# Patient Record
Sex: FEMALE | Race: WHITE | NOT HISPANIC OR LATINO | Employment: FULL TIME | URBAN - METROPOLITAN AREA
[De-identification: names, ages, dates, MRNs, and addresses within clinical notes are randomized per-mention and may not be internally consistent; named-entity substitution may affect disease eponyms.]

---

## 2017-01-03 ENCOUNTER — OFFICE VISIT - HEALTHEAST (OUTPATIENT)
Dept: FAMILY MEDICINE | Facility: CLINIC | Age: 42
End: 2017-01-03

## 2017-01-03 DIAGNOSIS — R30.0 DYSURIA: ICD-10-CM

## 2017-01-03 DIAGNOSIS — N39.0 UTI (URINARY TRACT INFECTION): ICD-10-CM

## 2017-02-02 ENCOUNTER — COMMUNICATION - HEALTHEAST (OUTPATIENT)
Dept: ENDOCRINOLOGY | Facility: CLINIC | Age: 42
End: 2017-02-02

## 2017-02-02 DIAGNOSIS — E03.9 UNSPECIFIED HYPOTHYROIDISM: ICD-10-CM

## 2017-06-06 ENCOUNTER — COMMUNICATION - HEALTHEAST (OUTPATIENT)
Dept: INTERNAL MEDICINE | Facility: CLINIC | Age: 42
End: 2017-06-06

## 2017-06-06 DIAGNOSIS — N39.0 UTI (URINARY TRACT INFECTION): ICD-10-CM

## 2017-10-17 ENCOUNTER — OFFICE VISIT - HEALTHEAST (OUTPATIENT)
Dept: INTERNAL MEDICINE | Facility: CLINIC | Age: 42
End: 2017-10-17

## 2017-10-17 ENCOUNTER — COMMUNICATION - HEALTHEAST (OUTPATIENT)
Dept: ENDOCRINOLOGY | Facility: CLINIC | Age: 42
End: 2017-10-17

## 2017-10-17 DIAGNOSIS — Z13.228 SCREENING FOR METABOLIC DISORDER: ICD-10-CM

## 2017-10-17 DIAGNOSIS — D35.3 BENIGN NEOPLASM OF PITUITARY GLAND AND CRANIOPHARYNGEAL DUCT (POUCH) (H): ICD-10-CM

## 2017-10-17 DIAGNOSIS — E03.9 HYPOTHYROIDISM: ICD-10-CM

## 2017-10-17 DIAGNOSIS — K51.90 ULCERATIVE COLITIS (H): ICD-10-CM

## 2017-10-17 DIAGNOSIS — D35.2 BENIGN NEOPLASM OF PITUITARY GLAND AND CRANIOPHARYNGEAL DUCT (POUCH) (H): ICD-10-CM

## 2017-10-17 DIAGNOSIS — K83.09 CHOLANGITIS (H): ICD-10-CM

## 2017-10-17 DIAGNOSIS — Z13.220 SCREENING CHOLESTEROL LEVEL: ICD-10-CM

## 2017-10-17 LAB
CHOLEST SERPL-MCNC: 255 MG/DL
FASTING STATUS PATIENT QL REPORTED: NO
HDLC SERPL-MCNC: 69 MG/DL
LDLC SERPL CALC-MCNC: 152 MG/DL
TRIGL SERPL-MCNC: 168 MG/DL

## 2017-10-17 ASSESSMENT — MIFFLIN-ST. JEOR: SCORE: 1842.48

## 2017-10-30 ENCOUNTER — AMBULATORY - HEALTHEAST (OUTPATIENT)
Dept: NURSING | Facility: CLINIC | Age: 42
End: 2017-10-30

## 2017-10-30 DIAGNOSIS — Z00.00 HEALTH CARE MAINTENANCE: ICD-10-CM

## 2017-12-06 ENCOUNTER — RECORDS - HEALTHEAST (OUTPATIENT)
Dept: ADMINISTRATIVE | Facility: OTHER | Age: 42
End: 2017-12-06

## 2017-12-22 ENCOUNTER — COMMUNICATION - HEALTHEAST (OUTPATIENT)
Dept: ENDOCRINOLOGY | Facility: CLINIC | Age: 42
End: 2017-12-22

## 2017-12-22 DIAGNOSIS — E03.9 HYPOTHYROIDISM: ICD-10-CM

## 2018-02-10 ENCOUNTER — COMMUNICATION - HEALTHEAST (OUTPATIENT)
Dept: ENDOCRINOLOGY | Facility: CLINIC | Age: 43
End: 2018-02-10

## 2018-02-10 DIAGNOSIS — E03.9 HYPOTHYROIDISM: ICD-10-CM

## 2018-03-27 ENCOUNTER — COMMUNICATION - HEALTHEAST (OUTPATIENT)
Dept: ENDOCRINOLOGY | Facility: CLINIC | Age: 43
End: 2018-03-27

## 2018-03-27 DIAGNOSIS — E03.9 HYPOTHYROIDISM: ICD-10-CM

## 2018-05-25 ENCOUNTER — COMMUNICATION - HEALTHEAST (OUTPATIENT)
Dept: ENDOCRINOLOGY | Facility: CLINIC | Age: 43
End: 2018-05-25

## 2018-05-25 DIAGNOSIS — E03.9 HYPOTHYROIDISM: ICD-10-CM

## 2018-06-11 ENCOUNTER — OFFICE VISIT - HEALTHEAST (OUTPATIENT)
Dept: ENDOCRINOLOGY | Facility: CLINIC | Age: 43
End: 2018-06-11

## 2018-06-11 DIAGNOSIS — D35.2 BENIGN NEOPLASM OF PITUITARY GLAND AND CRANIOPHARYNGEAL DUCT (POUCH) (H): ICD-10-CM

## 2018-06-11 DIAGNOSIS — D35.3 BENIGN NEOPLASM OF PITUITARY GLAND AND CRANIOPHARYNGEAL DUCT (POUCH) (H): ICD-10-CM

## 2018-06-11 DIAGNOSIS — E03.9 HYPOTHYROIDISM, UNSPECIFIED TYPE: ICD-10-CM

## 2018-06-11 ASSESSMENT — MIFFLIN-ST. JEOR: SCORE: 1925.99

## 2018-06-12 LAB
CALCIUM SERPL-MCNC: 9.4 MG/DL (ref 8.5–10.5)
CREAT SERPL-MCNC: 0.75 MG/DL (ref 0.6–1.1)
GFR SERPL CREATININE-BSD FRML MDRD: >60 ML/MIN/1.73M2
HBA1C MFR BLD: 5.7 % (ref 3.5–6)
POTASSIUM BLD-SCNC: 4.2 MMOL/L (ref 3.5–5)
PROLACTIN SERPL-MCNC: 14.8 NG/ML (ref 0–20)
PTH-INTACT SERPL-MCNC: 72 PG/ML (ref 10–86)
T3 SERPL-MCNC: 109 NG/DL (ref 45–175)
T4 FREE SERPL-MCNC: 1.5 NG/DL (ref 0.7–1.8)
TSH SERPL DL<=0.005 MIU/L-ACNC: 0.24 UIU/ML (ref 0.3–5)

## 2018-06-13 ENCOUNTER — HOSPITAL ENCOUNTER (OUTPATIENT)
Dept: ULTRASOUND IMAGING | Facility: HOSPITAL | Age: 43
Discharge: HOME | End: 2018-06-13
Attending: INTERNAL MEDICINE

## 2018-06-13 DIAGNOSIS — D35.3 BENIGN NEOPLASM OF PITUITARY GLAND AND CRANIOPHARYNGEAL DUCT (POUCH) (H): ICD-10-CM

## 2018-06-13 DIAGNOSIS — D35.2 BENIGN NEOPLASM OF PITUITARY GLAND AND CRANIOPHARYNGEAL DUCT (POUCH) (H): ICD-10-CM

## 2018-06-13 LAB
25(OH)D3 SERPL-MCNC: 19.5 NG/ML (ref 30–80)
25(OH)D3 SERPL-MCNC: 19.5 NG/ML (ref 30–80)

## 2018-06-14 LAB — THYROID PEROXIDASE ANTIBODIES - HISTORICAL: 89.3 IU/ML (ref 0–5.6)

## 2018-06-19 ENCOUNTER — COMMUNICATION - HEALTHEAST (OUTPATIENT)
Dept: ENDOCRINOLOGY | Facility: CLINIC | Age: 43
End: 2018-06-19

## 2018-06-29 ENCOUNTER — COMMUNICATION - HEALTHEAST (OUTPATIENT)
Dept: ENDOCRINOLOGY | Facility: CLINIC | Age: 43
End: 2018-06-29

## 2018-06-29 DIAGNOSIS — E03.9 HYPOTHYROIDISM: ICD-10-CM

## 2018-08-14 ENCOUNTER — OFFICE VISIT - HEALTHEAST (OUTPATIENT)
Dept: FAMILY MEDICINE | Facility: CLINIC | Age: 43
End: 2018-08-14

## 2018-08-14 DIAGNOSIS — K51.90 ULCERATIVE COLITIS (H): ICD-10-CM

## 2018-08-14 DIAGNOSIS — Z00.00 ROUTINE GENERAL MEDICAL EXAMINATION AT A HEALTH CARE FACILITY: ICD-10-CM

## 2018-08-14 DIAGNOSIS — K83.09 CHOLANGITIS (H): ICD-10-CM

## 2018-08-14 DIAGNOSIS — T14.8XXA WOUND INFECTION: ICD-10-CM

## 2018-08-14 DIAGNOSIS — E66.01 MORBID OBESITY (H): ICD-10-CM

## 2018-08-14 DIAGNOSIS — Z12.31 VISIT FOR SCREENING MAMMOGRAM: ICD-10-CM

## 2018-08-14 DIAGNOSIS — L08.9 WOUND INFECTION: ICD-10-CM

## 2018-08-14 ASSESSMENT — MIFFLIN-ST. JEOR: SCORE: 1939.5

## 2018-08-24 ENCOUNTER — HOSPITAL ENCOUNTER (OUTPATIENT)
Dept: MAMMOGRAPHY | Facility: CLINIC | Age: 43
Discharge: HOME | End: 2018-08-24
Attending: FAMILY MEDICINE

## 2018-08-24 DIAGNOSIS — Z12.31 VISIT FOR SCREENING MAMMOGRAM: ICD-10-CM

## 2018-08-30 ENCOUNTER — RECORDS - HEALTHEAST (OUTPATIENT)
Dept: RADIOLOGY | Facility: CLINIC | Age: 43
End: 2018-08-30

## 2018-08-30 ENCOUNTER — RECORDS - HEALTHEAST (OUTPATIENT)
Dept: ADMINISTRATIVE | Facility: OTHER | Age: 43
End: 2018-08-30

## 2018-09-02 ENCOUNTER — COMMUNICATION - HEALTHEAST (OUTPATIENT)
Dept: INTERNAL MEDICINE | Facility: CLINIC | Age: 43
End: 2018-09-02

## 2018-09-02 DIAGNOSIS — K51.90 UC (ULCERATIVE COLITIS) (H): ICD-10-CM

## 2018-09-13 ENCOUNTER — COMMUNICATION - HEALTHEAST (OUTPATIENT)
Dept: INTERNAL MEDICINE | Facility: CLINIC | Age: 43
End: 2018-09-13

## 2018-12-05 ENCOUNTER — COMMUNICATION - HEALTHEAST (OUTPATIENT)
Dept: ENDOCRINOLOGY | Facility: CLINIC | Age: 43
End: 2018-12-05

## 2018-12-05 DIAGNOSIS — D35.3 BENIGN NEOPLASM OF PITUITARY GLAND AND CRANIOPHARYNGEAL DUCT (POUCH) (H): ICD-10-CM

## 2018-12-05 DIAGNOSIS — E03.9 HYPOTHYROIDISM, UNSPECIFIED TYPE: ICD-10-CM

## 2018-12-05 DIAGNOSIS — D35.2 BENIGN NEOPLASM OF PITUITARY GLAND AND CRANIOPHARYNGEAL DUCT (POUCH) (H): ICD-10-CM

## 2018-12-15 ENCOUNTER — COMMUNICATION - HEALTHEAST (OUTPATIENT)
Dept: INTERNAL MEDICINE | Facility: CLINIC | Age: 43
End: 2018-12-15

## 2018-12-15 DIAGNOSIS — K51.90 UC (ULCERATIVE COLITIS) (H): ICD-10-CM

## 2018-12-20 ENCOUNTER — AMBULATORY - HEALTHEAST (OUTPATIENT)
Dept: LAB | Facility: CLINIC | Age: 43
End: 2018-12-20

## 2018-12-20 DIAGNOSIS — E03.9 HYPOTHYROIDISM, UNSPECIFIED TYPE: ICD-10-CM

## 2018-12-20 DIAGNOSIS — D35.2 BENIGN NEOPLASM OF PITUITARY GLAND AND CRANIOPHARYNGEAL DUCT (POUCH) (H): ICD-10-CM

## 2018-12-20 DIAGNOSIS — D35.3 BENIGN NEOPLASM OF PITUITARY GLAND AND CRANIOPHARYNGEAL DUCT (POUCH) (H): ICD-10-CM

## 2018-12-20 LAB
CALCIUM SERPL-MCNC: 9.5 MG/DL (ref 8.5–10.5)
CHOLEST SERPL-MCNC: 207 MG/DL
CREAT SERPL-MCNC: 0.68 MG/DL (ref 0.6–1.1)
FASTING STATUS PATIENT QL REPORTED: YES
GFR SERPL CREATININE-BSD FRML MDRD: >60 ML/MIN/1.73M2
HDLC SERPL-MCNC: 65 MG/DL
LDLC SERPL CALC-MCNC: 107 MG/DL
POTASSIUM BLD-SCNC: 3.9 MMOL/L (ref 3.5–5)
PROLACTIN SERPL-MCNC: 12.4 NG/ML (ref 0–20)
T4 FREE SERPL-MCNC: 1.3 NG/DL (ref 0.7–1.8)
TRIGL SERPL-MCNC: 173 MG/DL
TSH SERPL DL<=0.005 MIU/L-ACNC: 1.5 UIU/ML (ref 0.3–5)

## 2018-12-21 LAB
25(OH)D3 SERPL-MCNC: 14.9 NG/ML (ref 30–80)
25(OH)D3 SERPL-MCNC: 14.9 NG/ML (ref 30–80)

## 2019-01-04 ENCOUNTER — OFFICE VISIT - HEALTHEAST (OUTPATIENT)
Dept: INTERNAL MEDICINE | Facility: CLINIC | Age: 44
End: 2019-01-04

## 2019-01-04 DIAGNOSIS — E03.8 OTHER SPECIFIED HYPOTHYROIDISM: ICD-10-CM

## 2019-01-04 DIAGNOSIS — K51.90 ULCERATIVE COLITIS WITHOUT COMPLICATIONS, UNSPECIFIED LOCATION (H): ICD-10-CM

## 2019-01-04 DIAGNOSIS — K83.01 PSC (PRIMARY SCLEROSING CHOLANGITIS) (H): ICD-10-CM

## 2019-01-04 DIAGNOSIS — E66.01 MORBID OBESITY (H): ICD-10-CM

## 2019-01-04 DIAGNOSIS — E55.9 VITAMIN D DEFICIENCY: ICD-10-CM

## 2019-01-04 DIAGNOSIS — D35.2 PROLACTINOMA (H): ICD-10-CM

## 2019-01-04 ASSESSMENT — MIFFLIN-ST. JEOR: SCORE: 2007.09

## 2019-01-30 ENCOUNTER — AMBULATORY - HEALTHEAST (OUTPATIENT)
Dept: ENDOCRINOLOGY | Facility: CLINIC | Age: 44
End: 2019-01-30

## 2019-01-30 ENCOUNTER — COMMUNICATION - HEALTHEAST (OUTPATIENT)
Dept: ENDOCRINOLOGY | Facility: CLINIC | Age: 44
End: 2019-01-30

## 2019-01-30 DIAGNOSIS — E55.9 VITAMIN D DEFICIENCY: ICD-10-CM

## 2019-01-30 DIAGNOSIS — D35.3 BENIGN NEOPLASM OF PITUITARY GLAND AND CRANIOPHARYNGEAL DUCT (POUCH) (H): ICD-10-CM

## 2019-01-30 DIAGNOSIS — E03.8 OTHER SPECIFIED HYPOTHYROIDISM: ICD-10-CM

## 2019-01-30 DIAGNOSIS — D35.2 BENIGN NEOPLASM OF PITUITARY GLAND AND CRANIOPHARYNGEAL DUCT (POUCH) (H): ICD-10-CM

## 2019-02-04 ENCOUNTER — COMMUNICATION - HEALTHEAST (OUTPATIENT)
Dept: ENDOCRINOLOGY | Facility: CLINIC | Age: 44
End: 2019-02-04

## 2019-02-04 DIAGNOSIS — E03.9 HYPOTHYROIDISM: ICD-10-CM

## 2019-02-18 ENCOUNTER — AMBULATORY - HEALTHEAST (OUTPATIENT)
Dept: LAB | Facility: CLINIC | Age: 44
End: 2019-02-18

## 2019-02-18 DIAGNOSIS — D35.3 BENIGN NEOPLASM OF PITUITARY GLAND AND CRANIOPHARYNGEAL DUCT (POUCH) (H): ICD-10-CM

## 2019-02-18 DIAGNOSIS — E55.9 VITAMIN D DEFICIENCY: ICD-10-CM

## 2019-02-18 DIAGNOSIS — D35.2 BENIGN NEOPLASM OF PITUITARY GLAND AND CRANIOPHARYNGEAL DUCT (POUCH) (H): ICD-10-CM

## 2019-02-18 LAB
CALCIUM SERPL-MCNC: 8.9 MG/DL (ref 8.5–10.5)
CHOLEST SERPL-MCNC: 216 MG/DL
CREAT SERPL-MCNC: 0.7 MG/DL (ref 0.6–1.1)
FASTING STATUS PATIENT QL REPORTED: YES
GFR SERPL CREATININE-BSD FRML MDRD: >60 ML/MIN/1.73M2
HDLC SERPL-MCNC: 54 MG/DL
LDLC SERPL CALC-MCNC: 109 MG/DL
POTASSIUM BLD-SCNC: 4.1 MMOL/L (ref 3.5–5)
PROLACTIN SERPL-MCNC: 10.5 NG/ML (ref 0–20)
T4 FREE SERPL-MCNC: 0.6 NG/DL (ref 0.7–1.8)
TRIGL SERPL-MCNC: 263 MG/DL
TSH SERPL DL<=0.005 MIU/L-ACNC: 10.77 UIU/ML (ref 0.3–5)

## 2019-02-19 LAB
25(OH)D3 SERPL-MCNC: 23.6 NG/ML (ref 30–80)
25(OH)D3 SERPL-MCNC: 23.6 NG/ML (ref 30–80)

## 2019-02-25 ENCOUNTER — COMMUNICATION - HEALTHEAST (OUTPATIENT)
Dept: ENDOCRINOLOGY | Facility: CLINIC | Age: 44
End: 2019-02-25

## 2019-02-28 ENCOUNTER — COMMUNICATION - HEALTHEAST (OUTPATIENT)
Dept: ADMINISTRATIVE | Facility: CLINIC | Age: 44
End: 2019-02-28

## 2019-02-28 DIAGNOSIS — E03.9 HYPOTHYROIDISM, UNSPECIFIED TYPE: ICD-10-CM

## 2019-02-28 DIAGNOSIS — E56.9 VITAMIN DEFICIENCY: ICD-10-CM

## 2019-03-13 ENCOUNTER — COMMUNICATION - HEALTHEAST (OUTPATIENT)
Dept: ENDOCRINOLOGY | Facility: CLINIC | Age: 44
End: 2019-03-13

## 2019-03-13 DIAGNOSIS — E03.9 HYPOTHYROIDISM, UNSPECIFIED TYPE: ICD-10-CM

## 2019-03-13 DIAGNOSIS — E56.9 VITAMIN DEFICIENCY: ICD-10-CM

## 2019-03-31 ENCOUNTER — COMMUNICATION - HEALTHEAST (OUTPATIENT)
Dept: INTERNAL MEDICINE | Facility: CLINIC | Age: 44
End: 2019-03-31

## 2019-03-31 DIAGNOSIS — K51.90 UC (ULCERATIVE COLITIS) (H): ICD-10-CM

## 2019-04-09 ENCOUNTER — COMMUNICATION - HEALTHEAST (OUTPATIENT)
Dept: LAB | Facility: CLINIC | Age: 44
End: 2019-04-09

## 2019-04-09 ENCOUNTER — AMBULATORY - HEALTHEAST (OUTPATIENT)
Dept: LAB | Facility: CLINIC | Age: 44
End: 2019-04-09

## 2019-04-09 DIAGNOSIS — K51.90 ULCERATIVE COLITIS WITHOUT COMPLICATIONS, UNSPECIFIED LOCATION (H): ICD-10-CM

## 2019-04-09 DIAGNOSIS — E03.9 HYPOTHYROIDISM, UNSPECIFIED TYPE: ICD-10-CM

## 2019-04-09 LAB
ALBUMIN SERPL-MCNC: 3.9 G/DL (ref 3.5–5)
ALP SERPL-CCNC: 137 U/L (ref 45–120)
ALT SERPL W P-5'-P-CCNC: 42 U/L (ref 0–45)
ANION GAP SERPL CALCULATED.3IONS-SCNC: 12 MMOL/L (ref 5–18)
AST SERPL W P-5'-P-CCNC: 36 U/L (ref 0–40)
BILIRUB SERPL-MCNC: 0.6 MG/DL (ref 0–1)
BUN SERPL-MCNC: 15 MG/DL (ref 8–22)
CALCIUM SERPL-MCNC: 9.6 MG/DL (ref 8.5–10.5)
CHLORIDE BLD-SCNC: 104 MMOL/L (ref 98–107)
CO2 SERPL-SCNC: 25 MMOL/L (ref 22–31)
CREAT SERPL-MCNC: 0.7 MG/DL (ref 0.6–1.1)
GFR SERPL CREATININE-BSD FRML MDRD: >60 ML/MIN/1.73M2
GLUCOSE BLD-MCNC: 79 MG/DL (ref 70–125)
POTASSIUM BLD-SCNC: 4.2 MMOL/L (ref 3.5–5)
PROT SERPL-MCNC: 7.6 G/DL (ref 6–8)
SODIUM SERPL-SCNC: 141 MMOL/L (ref 136–145)
T4 FREE SERPL-MCNC: 1.5 NG/DL (ref 0.7–1.8)
TSH SERPL DL<=0.005 MIU/L-ACNC: 0.52 UIU/ML (ref 0.3–5)

## 2019-04-10 ENCOUNTER — COMMUNICATION - HEALTHEAST (OUTPATIENT)
Dept: FAMILY MEDICINE | Facility: CLINIC | Age: 44
End: 2019-04-10

## 2019-06-07 ENCOUNTER — TELEPHONE (OUTPATIENT)
Dept: ENDOCRINOLOGY | Facility: CLINIC | Age: 44
End: 2019-06-07

## 2019-06-07 NOTE — TELEPHONE ENCOUNTER
Reason for Call:  Other call back    Detailed comments: Pt would like to know if she needs to do any labs prior to her appt. She states she was seen by another endocrinologist outside of [place], but the endocrinologist is out on leave.    I asked where she saw this provider and she said [facility] and [facility] because they are the same. I tried explaining that they were not. If it would be [facility], she would like to know if the clinic could obtain her records for her.    Phone Number Patient can be reached at: Home number on file [phone] (home)    Best Time: After 6/23/19, any time. [place] will be out of country.    Can we leave a detailed message on this number? YES    Call taken on 6/7/2019 at 4:18 PM by Artur Lima

## 2019-06-10 NOTE — TELEPHONE ENCOUNTER
We can't order labs until patient is seen.    Suyapa Juan, DAVID  [facility]  [place]

## 2019-08-06 ENCOUNTER — OFFICE VISIT (OUTPATIENT)
Dept: ENDOCRINOLOGY | Facility: CLINIC | Age: 44
End: 2019-08-06
Payer: COMMERCIAL

## 2019-08-06 VITALS
WEIGHT: 289 LBS | DIASTOLIC BLOOD PRESSURE: 94 MMHG | RESPIRATION RATE: 16 BRPM | SYSTOLIC BLOOD PRESSURE: 153 MMHG | BODY MASS INDEX: 43.8 KG/M2 | OXYGEN SATURATION: 96 % | HEART RATE: 72 BPM | TEMPERATURE: 97.6 F | HEIGHT: 68 IN

## 2019-08-06 DIAGNOSIS — E22.1 HYPERPROLACTINEMIA (H): Primary | ICD-10-CM

## 2019-08-06 PROBLEM — E66.01 MORBID OBESITY (H): Status: ACTIVE | Noted: 2019-08-06

## 2019-08-06 LAB — PROLACTIN SERPL-MCNC: 10 UG/L (ref 3–27)

## 2019-08-06 PROCEDURE — 36415 COLL VENOUS BLD VENIPUNCTURE: CPT | Performed by: INTERNAL MEDICINE

## 2019-08-06 PROCEDURE — 84146 ASSAY OF PROLACTIN: CPT | Performed by: INTERNAL MEDICINE

## 2019-08-06 PROCEDURE — 99204 OFFICE O/P NEW MOD 45 MIN: CPT | Performed by: INTERNAL MEDICINE

## 2019-08-06 RX ORDER — MESALAMINE 800 MG/1
TABLET, DELAYED RELEASE ORAL
Refills: 0 | COMMUNITY
Start: 2019-04-09 | End: 2022-05-19

## 2019-08-06 RX ORDER — LEVOTHYROXINE SODIUM 112 UG/1
TABLET ORAL
Refills: 1 | COMMUNITY
Start: 2019-06-23 | End: 2021-09-03

## 2019-08-06 RX ORDER — ERGOCALCIFEROL 1.25 MG/1
CAPSULE, LIQUID FILLED ORAL
Refills: 1 | COMMUNITY
Start: 2019-07-21 | End: 2021-10-25

## 2019-08-06 ASSESSMENT — MIFFLIN-ST. JEOR: SCORE: 1995.43

## 2019-08-06 NOTE — PROGRESS NOTES
Name: Sherry Yates  Seen in consultation with  for elevated prolactin.  HPI:  Sherry Yates is a 44 year old female who presents for the evaluation of hyperprolactinemia.  Was diagnosed with hyperprolactinemia 2/2 to prolactin adenoma at age 14. At that time she had nipple discharge.  Was seen by  and  after that.  Was on bromocriptine in past ( from age 14 till age 32). Went off of medication as she was pregnant at that time.  Prolactin was in range after that.  Not on medication since age 32.    Last MRI was many years back,    Breast discharge: no  Mensus: Regular. Not planning pregnancy.  Changes in vision: no  Headaches: no  Medications: no  Hypothyroidism: Currently taking levothyroxine 224 mcg/day. Managed by PCP [facility].    PMH/PSH:  Pituitary Prolactinoma     Obesity     Primary Sclerosing Ascending Cholangitis     Primary Hypothyroidism     Butterfly rash   ERCP induced pancreatitis.      Past Medical History:   Diagnosis Date     PSC (primary sclerosing cholangitis)      Thyroid disease      Ulcerative colitis (H)      Past Surgical History:   Procedure Laterality Date     ABDOMEN SURGERY       Family Hx:  History reviewed. No pertinent family history.    Social Hx:  Social History     Socioeconomic History     Marital status:      Spouse name: Not on file     Number of children: Not on file     Years of education: Not on file     Highest education level: Not on file   Occupational History     Not on file   Social Needs     Financial resource strain: Not on file     Food insecurity:     Worry: Not on file     Inability: Not on file     Transportation needs:     Medical: Not on file     Non-medical: Not on file   Tobacco Use     Smoking status: Never Smoker     Smokeless tobacco: Never Used   Substance and Sexual Activity     Alcohol use: Yes     Comment: rare     Drug use: Never     Sexual activity: Not Currently     Partners: Male   Lifestyle     Physical  "activity:     Days per week: Not on file     Minutes per session: Not on file     Stress: Not on file   Relationships     Social connections:     Talks on phone: Not on file     Gets together: Not on file     Attends Mosque service: Not on file     Active member of club or organization: Not on file     Attends meetings of clubs or organizations: Not on file     Relationship status: Not on file     Intimate partner violence:     Fear of current or ex partner: Not on file     Emotionally abused: Not on file     Physically abused: Not on file     Forced sexual activity: Not on file   Other Topics Concern     Not on file   Social History Narrative     Not on file          MEDICATIONS:  has a current medication list which includes the following prescription(s): levothyroxine, mesalamine, and vitamin d2.    ROS     ROS: 10 point ROS neg other than the symptoms noted above in the HPI.    Physical Exam   VS: BP (!) 153/94 (BP Location: Right arm, Patient Position: Chair, Cuff Size: Adult Large)   Pulse 72   Temp 97.6  F (36.4  C) (Oral)   Resp 16   Ht 1.721 m (5' 7.75\")   Wt 131.1 kg (289 lb)   LMP 08/01/2019   SpO2 96%   Breastfeeding? No   BMI 44.27 kg/m    GENERAL: AXOX3, NAD, well dressed, answering questions appropriately, appears stated age.  HEENT: OP clear, no LAD, no TM, non-tender, no exopthalmous, no proptosis, EOMI, no lig lag, no retraction  CV: RRR, no rubs, gallops, no murmurs  LUNGS: CTAB, no wheezes, rales, or ronchi  ABDOMEN: +BS  EXTREMITIES: no edema, +pulses, no rashes, no lesions  NEUROLOGY: CN grossly intact, + DTR upper and lower extremity, no tremors  MSK: grossly intact  SKIN: no rashes, no lesions    LABS:    All pertinent notes, labs, and images personally reviewed by me.     A/P  Ms.Renae Yates is a 44 year old here for the evaluation of elevated prolactin:  1.  History of prolactinoma:  History of hyperprolactinemia secondary to prolactinoma diagnosed at age 14.  As noted above she " was on bromocriptine from [age].  Off of medication since age 32  No nipple discharge, irregular menstrual cycles, headaches or vision problem  She is not planning pregnancy  Last MRI was 2008?  Records requested  Plan to check prolactin levels.  If normal plan to continue to monitor  If prolactin levels are elevated can pursue further work-up including screening for rest of the pituitary hormones as well as getting MRI brain.    2.  Hypothyroidism:  Managed by primary care provider.  She is on thyroid hormone replacement.  Not discussed today        Prolactin secreting pituitary tumors are the most common type of hormone secreting pituitary tumors.     Causes: Hypothyroidism can be associated with hyperprolactinemia. Hypothyroidism results in increased TRH production, then TRH (which can act as a PRF) could lead to hyperprolactinemia. Estrogens act directly at the pituitary level, causing stimulation of lactotrophs, and thus enhance prolactin secretion. Injury to the chest wall can also lead to hyperprolactinemia; this results from abnormal stimulation of the reflex associated with the rise in prolactin seen in suckling. Certain drugs act as dopamine-receptor-blocking agents, including phenothiazines (e.g. chlorpromazine), butyrophenones (haloperidol), and benzamides (metoclopramide, sulpiride, and domperidone). These drugs block the effects of endogenous dopamine and thus release lactotrophs from their hypothalamic inhibition. Pituitary or stalk tumors with abnormal blood supplies, or their pressure effects, may interfere with the circulatory pathway from the hypothalamus down the pituitary stalk to the normal lactotrophs or a tumor, producing effective dopamine deficiency due to a functional stalk section. Diseases of the hypothalamus, such as tumors, arterio-venous malformations, and inflammatory processes such as sarcoidosis result in either diminished synthesis or release of dopamine. Certain drugs (e.g.  alpha-methyldopa and reserpine) are capable of depleting the central dopamine stores.     The symptoms associated with hyperprolactinemia may be due to several factors: the direct effects of excess prolactin à galactorrhea or hypogonadism; the effects of the structural lesion causing the disorder (i.e. the pituitary tumor) à  headaches, visual field defects, or external ophthalmoplegia; or associated dysfunction of secretion of other anterior pituitary hormones.     Women with hyperprolactinemia usually present with menstrual abnormalities - amenorrhea or oligomenorrhea - or regular cycles with infertility or menorrhagia. Men often present late in the course of the disease with symptoms of expansion of their pituitary tumor (i.e. headaches, visual defects, and external ophthalmoplegia) or symptoms from secondary adrenal or thyroid failure.     A microadenoma has a maximum diameter of up to 10mm, and a macroadenoma as having a diameter > 10mm. A microadenoma has serum prolactin levels below 200ng/mL.  A macroadenoma that secretes prolactin is usually associated with a serum prolactin level of more than 200ng/mL.    When serum prolactin is evaluated by two-site immunometric assays, large amounts of prolactin saturate both the capture and the signal antibodies, impairing their binding, causing serum prolactin to be underestimated (hook effect).   In order to avoid unnecessary surgery (treatment of choice for nonfunctioning tumors), prolactin assays with serum dilution are recommended in patients with macroadenomas who may harbor a prolactinoma.    The anatomy of the pituitary is optimally assessed by MRI. MRI allows imaging of the optic chiasm, the cavernous sinuses, the pituitary (both the normal gland and tumors), and its stalk. MRI allows accurate measurement of the size of the pituitary and of any tumor and its relationship to the optic chiasm and cavernous sinuses.     Cabergoline has an extremely long biological  half life and thus only needs to be administered either once or twice per week at a dose of 0.5 mg/dose.  Cabergoline is generally better tolerated than bromocriptine, so increasing the patient's adherence. Therefore, cabergoline is currently considered the first choice drug for the treatment of prolactinomas, except for patients wishing pregnancy in the short-term. Bromocriptine reduces pituitary tumor size in 75-80% of patients with large prolactin-secreting tumors, even with gross extrasellar extension.    More than 50% of the time spent with Ms. Yates on counseling / coordinating her care.  Available records, labs and images from outside clinic were personally reviewed.    Follow-up:  Based on above.    Fanny Clark MD  Endocrinology   [facility]  CC: No Ref-Primary, Physician    Addendum to above note and clinic visit:    Labs reviewed.    See result note/telephone encounter.

## 2019-08-06 NOTE — LETTER
8/6/2019         RE: Sherry Yates  [address] [facility] [address]        Dear Colleague,    Thank you for referring your patient, Sherry Yates, to the [facility]. Please see a copy of my visit note below.    Name: Sherry Yates  Seen in consultation with  for elevated prolactin.  HPI:  Sherry Yates is a 44 year old female who presents for the evaluation of hyperprolactinemia.  Was diagnosed with hyperprolactinemia 2/2 to prolactin adenoma at age 14. At that time she had nipple discharge.  Was seen by  and  after that.  Was on bromocriptine in past ( from age 14 till age 32). Went off of medication as she was pregnant at that time.  Prolactin was in range after that.  Not on medication since age 32.    Last MRI was many years back,    Breast discharge: no  Mensus: Regular. Not planning pregnancy.  Changes in vision: no  Headaches: no  Medications: no  Hypothyroidism: Currently taking levothyroxine 224 mcg/day. Managed by PCP [facility].    PMH/PSH:  Pituitary Prolactinoma     Obesity     Primary Sclerosing Ascending Cholangitis     Primary Hypothyroidism     Butterfly rash   ERCP induced pancreatitis.      Past Medical History:   Diagnosis Date     PSC (primary sclerosing cholangitis)      Thyroid disease      Ulcerative colitis (H)      Past Surgical History:   Procedure Laterality Date     ABDOMEN SURGERY       Family Hx:  History reviewed. No pertinent family history.    Social Hx:  Social History     Socioeconomic History     Marital status:      Spouse name: Not on file     Number of children: Not on file     Years of education: Not on file     Highest education level: Not on file   Occupational History     Not on file   Social Needs     Financial resource strain: Not on file     Food insecurity:     Worry: Not on file     Inability: Not on file     Transportation needs:     Medical: Not on file     Non-medical: Not on file   Tobacco Use      "Smoking status: Never Smoker     Smokeless tobacco: Never Used   Substance and Sexual Activity     Alcohol use: Yes     Comment: rare     Drug use: Never     Sexual activity: Not Currently     Partners: Male   Lifestyle     Physical activity:     Days per week: Not on file     Minutes per session: Not on file     Stress: Not on file   Relationships     Social connections:     Talks on phone: Not on file     Gets together: Not on file     Attends Yarsani service: Not on file     Active member of club or organization: Not on file     Attends meetings of clubs or organizations: Not on file     Relationship status: Not on file     Intimate partner violence:     Fear of current or ex partner: Not on file     Emotionally abused: Not on file     Physically abused: Not on file     Forced sexual activity: Not on file   Other Topics Concern     Not on file   Social History Narrative     Not on file          MEDICATIONS:  has a current medication list which includes the following prescription(s): levothyroxine, mesalamine, and vitamin d2.    ROS     ROS: 10 point ROS neg other than the symptoms noted above in the HPI.    Physical Exam   VS: BP (!) 153/94 (BP Location: Right arm, Patient Position: Chair, Cuff Size: Adult Large)   Pulse 72   Temp 97.6  F (36.4  C) (Oral)   Resp 16   Ht 1.721 m (5' 7.75\")   Wt 131.1 kg (289 lb)   LMP 08/01/2019   SpO2 96%   Breastfeeding? No   BMI 44.27 kg/m     GENERAL: AXOX3, NAD, well dressed, answering questions appropriately, appears stated age.  HEENT: OP clear, no LAD, no TM, non-tender, no exopthalmous, no proptosis, EOMI, no lig lag, no retraction  CV: RRR, no rubs, gallops, no murmurs  LUNGS: CTAB, no wheezes, rales, or ronchi  ABDOMEN: +BS  EXTREMITIES: no edema, +pulses, no rashes, no lesions  NEUROLOGY: CN grossly intact, + DTR upper and lower extremity, no tremors  MSK: grossly intact  SKIN: no rashes, no lesions    LABS:    All pertinent notes, labs, and images " personally reviewed by me.     A/P  Ms.Renae Yates is a 44 year old here for the evaluation of elevated prolactin:  1.  History of prolactinoma:  History of hyperprolactinemia secondary to prolactinoma diagnosed at age 14.  As noted above she was on bromocriptine from [age].  Off of medication since age 32  No nipple discharge, irregular menstrual cycles, headaches or vision problem  She is not planning pregnancy  Last MRI was 2008?  Records requested  Plan to check prolactin levels.  If normal plan to continue to monitor  If prolactin levels are elevated can pursue further work-up including screening for rest of the pituitary hormones as well as getting MRI brain.    2.  Hypothyroidism:  Managed by primary care provider.  She is on thyroid hormone replacement.  Not discussed today        Prolactin secreting pituitary tumors are the most common type of hormone secreting pituitary tumors.     Causes: Hypothyroidism can be associated with hyperprolactinemia. Hypothyroidism results in increased TRH production, then TRH (which can act as a PRF) could lead to hyperprolactinemia. Estrogens act directly at the pituitary level, causing stimulation of lactotrophs, and thus enhance prolactin secretion. Injury to the chest wall can also lead to hyperprolactinemia; this results from abnormal stimulation of the reflex associated with the rise in prolactin seen in suckling. Certain drugs act as dopamine-receptor-blocking agents, including phenothiazines (e.g. chlorpromazine), butyrophenones (haloperidol), and benzamides (metoclopramide, sulpiride, and domperidone). These drugs block the effects of endogenous dopamine and thus release lactotrophs from their hypothalamic inhibition. Pituitary or stalk tumors with abnormal blood supplies, or their pressure effects, may interfere with the circulatory pathway from the hypothalamus down the pituitary stalk to the normal lactotrophs or a tumor, producing effective dopamine deficiency  due to a functional stalk section. Diseases of the hypothalamus, such as tumors, arterio-venous malformations, and inflammatory processes such as sarcoidosis result in either diminished synthesis or release of dopamine. Certain drugs (e.g. alpha-methyldopa and reserpine) are capable of depleting the central dopamine stores.     The symptoms associated with hyperprolactinemia may be due to several factors: the direct effects of excess prolactin à galactorrhea or hypogonadism; the effects of the structural lesion causing the disorder (i.e. the pituitary tumor) à  headaches, visual field defects, or external ophthalmoplegia; or associated dysfunction of secretion of other anterior pituitary hormones.     Women with hyperprolactinemia usually present with menstrual abnormalities - amenorrhea or oligomenorrhea - or regular cycles with infertility or menorrhagia. Men often present late in the course of the disease with symptoms of expansion of their pituitary tumor (i.e. headaches, visual defects, and external ophthalmoplegia) or symptoms from secondary adrenal or thyroid failure.     A microadenoma has a maximum diameter of up to 10mm, and a macroadenoma as having a diameter > 10mm. A microadenoma has serum prolactin levels below 200ng/mL.  A macroadenoma that secretes prolactin is usually associated with a serum prolactin level of more than 200ng/mL.    When serum prolactin is evaluated by two-site immunometric assays, large amounts of prolactin saturate both the capture and the signal antibodies, impairing their binding, causing serum prolactin to be underestimated (hook effect).   In order to avoid unnecessary surgery (treatment of choice for nonfunctioning tumors), prolactin assays with serum dilution are recommended in patients with macroadenomas who may harbor a prolactinoma.    The anatomy of the pituitary is optimally assessed by MRI. MRI allows imaging of the optic chiasm, the cavernous sinuses, the pituitary  (both the normal gland and tumors), and its stalk. MRI allows accurate measurement of the size of the pituitary and of any tumor and its relationship to the optic chiasm and cavernous sinuses.     Cabergoline has an extremely long biological half life and thus only needs to be administered either once or twice per week at a dose of 0.5 mg/dose.  Cabergoline is generally better tolerated than bromocriptine, so increasing the patient's adherence. Therefore, cabergoline is currently considered the first choice drug for the treatment of prolactinomas, except for patients wishing pregnancy in the short-term. Bromocriptine reduces pituitary tumor size in 75-80% of patients with large prolactin-secreting tumors, even with gross extrasellar extension.    More than 50% of the time spent with Ms. Yates on counseling / coordinating her care.  Available records, labs and images from outside clinic were personally reviewed.    Follow-up:  Based on above.    Fanny Clark MD  Endocrinology   [facility]  CC: No Ref-Primary, Physician    Addendum to above note and clinic visit:    Labs reviewed.    See result note/telephone encounter.                Again, thank you for allowing me to participate in the care of your patient.        Sincerely,        Fanny Clark MD

## 2019-08-06 NOTE — PATIENT INSTRUCTIONS
[facility]   Dr Clark, Endocrinology Department      [facility]   [address] [facility] [facility] [address]  Appointment [phone]  Fax: [phone]   Monday and Tuesday         [address] [place] [address] [address]  Appointment [phone]  Fax: [phone]  Wednesday and Thursday           Labs today.  If prolactin levels are high then consider further work up including MRI.   negative Alert & oriented; no sensory, motor or coordination deficits, normal reflexes detailed exam

## 2019-08-06 NOTE — LETTER
[facility]  [address], [address]  [place] [address]  [phone]        August 7, 2019    Sherry Yates  [address] [facility] [address]            Dear Ms. Sherry Yates:    Labs/ Imaging studies done with endocrinology showed normal prolactin levels.   As discussed in clinic visit as labs are in range and without major symptoms plan to continue to monitor.   Repeat prolactin in 1 year or sooner if concerns.   Here is a copy for your records.     Please call endocrinology clinic (nurse line: [phone]) if questions.     Sincerely,      Dr. Fanny Clark    Results for orders placed or performed in visit on 08/06/19   Prolactin   Result Value Ref Range    Prolactin 10 3 - 27 ug/L

## 2019-08-12 ENCOUNTER — TELEPHONE (OUTPATIENT)
Dept: ENDOCRINOLOGY | Facility: CLINIC | Age: 44
End: 2019-08-12

## 2019-08-12 NOTE — TELEPHONE ENCOUNTER
Records from outside clinic reviewed.  MRI was done in 2008.  Impression no change since November 2004.  Empty sella morphology.  Deviation of pituitary infundibulum to the left.  No definite evidence of pituitary adenoma.  And the intracranial contents are otherwise normal.

## 2019-08-12 NOTE — TELEPHONE ENCOUNTER
Received MRI results.  Saw patient on 08/06/19 for hyperprolactinemia.    Suyapa Juan, DAVID  [facility]  [place]

## 2019-10-21 ENCOUNTER — COMMUNICATION - HEALTHEAST (OUTPATIENT)
Dept: INTERNAL MEDICINE | Facility: CLINIC | Age: 44
End: 2019-10-21

## 2019-10-21 ENCOUNTER — COMMUNICATION - HEALTHEAST (OUTPATIENT)
Dept: ENDOCRINOLOGY | Facility: CLINIC | Age: 44
End: 2019-10-21

## 2019-10-21 DIAGNOSIS — E03.9 HYPOTHYROIDISM, UNSPECIFIED TYPE: ICD-10-CM

## 2019-10-21 DIAGNOSIS — K51.90 UC (ULCERATIVE COLITIS) (H): ICD-10-CM

## 2019-10-21 DIAGNOSIS — E56.9 VITAMIN DEFICIENCY: ICD-10-CM

## 2019-11-18 ENCOUNTER — OFFICE VISIT - HEALTHEAST (OUTPATIENT)
Dept: FAMILY MEDICINE | Facility: CLINIC | Age: 44
End: 2019-11-18

## 2019-11-18 DIAGNOSIS — Z00.00 ENCOUNTER FOR GENERAL ADULT MEDICAL EXAMINATION WITHOUT ABNORMAL FINDINGS: ICD-10-CM

## 2019-11-18 DIAGNOSIS — E66.01 MORBID OBESITY (H): ICD-10-CM

## 2019-11-18 DIAGNOSIS — M25.572 PAIN IN JOINT INVOLVING ANKLE AND FOOT, LEFT: ICD-10-CM

## 2019-11-18 DIAGNOSIS — E56.9 VITAMIN DEFICIENCY: ICD-10-CM

## 2019-11-18 DIAGNOSIS — Z12.31 VISIT FOR SCREENING MAMMOGRAM: ICD-10-CM

## 2019-11-18 DIAGNOSIS — E03.9 HYPOTHYROIDISM, UNSPECIFIED TYPE: ICD-10-CM

## 2019-11-18 DIAGNOSIS — K51.90 ULCERATIVE COLITIS WITHOUT COMPLICATIONS, UNSPECIFIED LOCATION (H): ICD-10-CM

## 2019-11-18 ASSESSMENT — MIFFLIN-ST. JEOR: SCORE: 2043.38

## 2019-12-30 ENCOUNTER — RECORDS - HEALTHEAST (OUTPATIENT)
Dept: MAMMOGRAPHY | Facility: CLINIC | Age: 44
End: 2019-12-30

## 2019-12-30 ENCOUNTER — AMBULATORY - HEALTHEAST (OUTPATIENT)
Dept: LAB | Facility: CLINIC | Age: 44
End: 2019-12-30

## 2019-12-30 DIAGNOSIS — Z00.00 ENCOUNTER FOR GENERAL ADULT MEDICAL EXAMINATION WITHOUT ABNORMAL FINDINGS: ICD-10-CM

## 2019-12-30 DIAGNOSIS — Z12.31 ENCOUNTER FOR SCREENING MAMMOGRAM FOR MALIGNANT NEOPLASM OF BREAST: ICD-10-CM

## 2019-12-30 DIAGNOSIS — E56.9 VITAMIN DEFICIENCY: ICD-10-CM

## 2019-12-30 DIAGNOSIS — E03.9 HYPOTHYROIDISM, UNSPECIFIED TYPE: ICD-10-CM

## 2019-12-30 LAB
BASOPHILS # BLD AUTO: 0.1 THOU/UL (ref 0–0.2)
BASOPHILS NFR BLD AUTO: 1 % (ref 0–2)
CHOLEST SERPL-MCNC: 189 MG/DL
EOSINOPHIL # BLD AUTO: 0.3 THOU/UL (ref 0–0.4)
EOSINOPHIL NFR BLD AUTO: 3 % (ref 0–6)
ERYTHROCYTE [DISTWIDTH] IN BLOOD BY AUTOMATED COUNT: 13.1 % (ref 11–14.5)
FASTING STATUS PATIENT QL REPORTED: YES
HCT VFR BLD AUTO: 39.4 % (ref 35–47)
HDLC SERPL-MCNC: 53 MG/DL
HGB BLD-MCNC: 13.1 G/DL (ref 12–16)
LDLC SERPL CALC-MCNC: 109 MG/DL
LYMPHOCYTES # BLD AUTO: 2.1 THOU/UL (ref 0.8–4.4)
LYMPHOCYTES NFR BLD AUTO: 21 % (ref 20–40)
MCH RBC QN AUTO: 29.9 PG (ref 27–34)
MCHC RBC AUTO-ENTMCNC: 33.2 G/DL (ref 32–36)
MCV RBC AUTO: 90 FL (ref 80–100)
MONOCYTES # BLD AUTO: 0.6 THOU/UL (ref 0–0.9)
MONOCYTES NFR BLD AUTO: 6 % (ref 2–10)
NEUTROPHILS # BLD AUTO: 7.2 THOU/UL (ref 2–7.7)
NEUTROPHILS NFR BLD AUTO: 70 % (ref 50–70)
PLATELET # BLD AUTO: 226 THOU/UL (ref 140–440)
PMV BLD AUTO: 8.5 FL (ref 7–10)
RBC # BLD AUTO: 4.37 MILL/UL (ref 3.8–5.4)
TRIGL SERPL-MCNC: 135 MG/DL
TSH SERPL DL<=0.005 MIU/L-ACNC: 0.45 UIU/ML (ref 0.3–5)
WBC: 10.2 THOU/UL (ref 4–11)

## 2019-12-31 LAB
25(OH)D3 SERPL-MCNC: 27 NG/ML (ref 30–80)
25(OH)D3 SERPL-MCNC: 27 NG/ML (ref 30–80)

## 2020-01-06 ENCOUNTER — COMMUNICATION - HEALTHEAST (OUTPATIENT)
Dept: FAMILY MEDICINE | Facility: CLINIC | Age: 45
End: 2020-01-06

## 2020-05-01 ENCOUNTER — COMMUNICATION - HEALTHEAST (OUTPATIENT)
Dept: INTERNAL MEDICINE | Facility: CLINIC | Age: 45
End: 2020-05-01

## 2020-05-01 DIAGNOSIS — K51.90 UC (ULCERATIVE COLITIS) (H): ICD-10-CM

## 2020-05-04 ENCOUNTER — COMMUNICATION - HEALTHEAST (OUTPATIENT)
Dept: FAMILY MEDICINE | Facility: CLINIC | Age: 45
End: 2020-05-04

## 2020-05-04 DIAGNOSIS — K51.90 UC (ULCERATIVE COLITIS) (H): ICD-10-CM

## 2020-05-21 ENCOUNTER — OFFICE VISIT - HEALTHEAST (OUTPATIENT)
Dept: FAMILY MEDICINE | Facility: CLINIC | Age: 45
End: 2020-05-21

## 2020-05-21 DIAGNOSIS — D35.2 PROLACTINOMA (H): ICD-10-CM

## 2020-05-21 DIAGNOSIS — E66.01 MORBID OBESITY (H): ICD-10-CM

## 2020-05-21 DIAGNOSIS — E03.9 HYPOTHYROIDISM, UNSPECIFIED TYPE: ICD-10-CM

## 2020-05-21 DIAGNOSIS — K51.90 ULCERATIVE COLITIS WITHOUT COMPLICATIONS, UNSPECIFIED LOCATION (H): ICD-10-CM

## 2020-06-17 ENCOUNTER — COMMUNICATION - HEALTHEAST (OUTPATIENT)
Dept: FAMILY MEDICINE | Facility: CLINIC | Age: 45
End: 2020-06-17

## 2020-06-17 DIAGNOSIS — E03.9 HYPOTHYROIDISM, UNSPECIFIED TYPE: ICD-10-CM

## 2020-06-17 DIAGNOSIS — E56.9 VITAMIN DEFICIENCY: ICD-10-CM

## 2020-07-13 ENCOUNTER — OFFICE VISIT - HEALTHEAST (OUTPATIENT)
Dept: FAMILY MEDICINE | Facility: CLINIC | Age: 45
End: 2020-07-13

## 2020-07-13 DIAGNOSIS — E56.9 VITAMIN DEFICIENCY: ICD-10-CM

## 2020-07-13 DIAGNOSIS — Z00.00 ROUTINE GENERAL MEDICAL EXAMINATION AT A HEALTH CARE FACILITY: ICD-10-CM

## 2020-07-13 DIAGNOSIS — B35.4 TINEA CORPORIS: ICD-10-CM

## 2020-07-13 DIAGNOSIS — K51.90 UC (ULCERATIVE COLITIS) (H): ICD-10-CM

## 2020-07-13 DIAGNOSIS — E03.9 HYPOTHYROIDISM, UNSPECIFIED TYPE: ICD-10-CM

## 2020-07-13 DIAGNOSIS — D35.2 PROLACTINOMA (H): ICD-10-CM

## 2020-07-13 LAB
ALBUMIN SERPL-MCNC: 3.7 G/DL (ref 3.5–5)
ALP SERPL-CCNC: 161 U/L (ref 45–120)
ALT SERPL W P-5'-P-CCNC: 47 U/L (ref 0–45)
ANION GAP SERPL CALCULATED.3IONS-SCNC: 10 MMOL/L (ref 5–18)
AST SERPL W P-5'-P-CCNC: 46 U/L (ref 0–40)
BILIRUB SERPL-MCNC: 1 MG/DL (ref 0–1)
BUN SERPL-MCNC: 12 MG/DL (ref 8–22)
CALCIUM SERPL-MCNC: 8.8 MG/DL (ref 8.5–10.5)
CHLORIDE BLD-SCNC: 105 MMOL/L (ref 98–107)
CHOLEST SERPL-MCNC: 196 MG/DL
CO2 SERPL-SCNC: 25 MMOL/L (ref 22–31)
CREAT SERPL-MCNC: 0.77 MG/DL (ref 0.6–1.1)
FASTING STATUS PATIENT QL REPORTED: YES
GFR SERPL CREATININE-BSD FRML MDRD: >60 ML/MIN/1.73M2
GLUCOSE BLD-MCNC: 108 MG/DL (ref 70–125)
HDLC SERPL-MCNC: 53 MG/DL
LDLC SERPL CALC-MCNC: 117 MG/DL
POTASSIUM BLD-SCNC: 4 MMOL/L (ref 3.5–5)
PROLACTIN SERPL-MCNC: 10.7 NG/ML (ref 0–20)
PROT SERPL-MCNC: 7.4 G/DL (ref 6–8)
SODIUM SERPL-SCNC: 140 MMOL/L (ref 136–145)
TRIGL SERPL-MCNC: 128 MG/DL
TSH SERPL DL<=0.005 MIU/L-ACNC: 0.88 UIU/ML (ref 0.3–5)

## 2020-07-13 ASSESSMENT — MIFFLIN-ST. JEOR: SCORE: 2057.89

## 2020-07-14 ENCOUNTER — COMMUNICATION - HEALTHEAST (OUTPATIENT)
Dept: FAMILY MEDICINE | Facility: CLINIC | Age: 45
End: 2020-07-14

## 2020-07-14 ENCOUNTER — AMBULATORY - HEALTHEAST (OUTPATIENT)
Dept: FAMILY MEDICINE | Facility: CLINIC | Age: 45
End: 2020-07-14

## 2020-07-14 DIAGNOSIS — E56.9 VITAMIN DEFICIENCY: ICD-10-CM

## 2020-07-14 DIAGNOSIS — R74.8 ELEVATED LIVER ENZYMES: ICD-10-CM

## 2020-07-14 LAB
25(OH)D3 SERPL-MCNC: 26.2 NG/ML (ref 30–80)
25(OH)D3 SERPL-MCNC: 26.2 NG/ML (ref 30–80)

## 2020-09-16 ENCOUNTER — COMMUNICATION - HEALTHEAST (OUTPATIENT)
Dept: SCHEDULING | Facility: CLINIC | Age: 45
End: 2020-09-16

## 2020-10-13 ENCOUNTER — COMMUNICATION - HEALTHEAST (OUTPATIENT)
Dept: FAMILY MEDICINE | Facility: CLINIC | Age: 45
End: 2020-10-13

## 2020-10-13 DIAGNOSIS — N92.1 MENORRHAGIA WITH IRREGULAR CYCLE: ICD-10-CM

## 2020-10-22 ENCOUNTER — SURGERY - HEALTHEAST (OUTPATIENT)
Dept: OBGYN | Facility: CLINIC | Age: 45
End: 2020-10-22

## 2020-10-22 ENCOUNTER — AMBULATORY - HEALTHEAST (OUTPATIENT)
Dept: SURGERY | Facility: AMBULATORY SURGERY CENTER | Age: 45
End: 2020-10-22

## 2020-10-22 ENCOUNTER — OFFICE VISIT - HEALTHEAST (OUTPATIENT)
Dept: OBGYN | Facility: CLINIC | Age: 45
End: 2020-10-22

## 2020-10-22 DIAGNOSIS — Z11.59 ENCOUNTER FOR SCREENING FOR OTHER VIRAL DISEASES: ICD-10-CM

## 2020-10-22 DIAGNOSIS — N92.1 MENORRHAGIA WITH IRREGULAR CYCLE: ICD-10-CM

## 2020-10-22 DIAGNOSIS — N88.2 CERVICAL STENOSIS (UTERINE CERVIX): ICD-10-CM

## 2020-11-05 ENCOUNTER — OFFICE VISIT - HEALTHEAST (OUTPATIENT)
Dept: FAMILY MEDICINE | Facility: CLINIC | Age: 45
End: 2020-11-05

## 2020-11-05 DIAGNOSIS — K51.90 UC (ULCERATIVE COLITIS) (H): ICD-10-CM

## 2020-11-05 DIAGNOSIS — D35.3 BENIGN NEOPLASM OF PITUITARY GLAND AND CRANIOPHARYNGEAL DUCT (POUCH) (H): ICD-10-CM

## 2020-11-05 DIAGNOSIS — K51.90 ULCERATIVE COLITIS WITHOUT COMPLICATIONS, UNSPECIFIED LOCATION (H): ICD-10-CM

## 2020-11-05 DIAGNOSIS — D35.2 BENIGN NEOPLASM OF PITUITARY GLAND AND CRANIOPHARYNGEAL DUCT (POUCH) (H): ICD-10-CM

## 2020-11-05 DIAGNOSIS — N92.1 MENORRHAGIA WITH IRREGULAR CYCLE: ICD-10-CM

## 2020-11-05 DIAGNOSIS — E03.8 OTHER SPECIFIED HYPOTHYROIDISM: ICD-10-CM

## 2020-11-05 DIAGNOSIS — Z01.818 PREOP GENERAL PHYSICAL EXAM: ICD-10-CM

## 2020-11-05 LAB
ALBUMIN SERPL-MCNC: 3.9 G/DL (ref 3.5–5)
ALP SERPL-CCNC: 184 U/L (ref 45–120)
ALT SERPL W P-5'-P-CCNC: 54 U/L (ref 0–45)
ANION GAP SERPL CALCULATED.3IONS-SCNC: 6 MMOL/L (ref 5–18)
AST SERPL W P-5'-P-CCNC: 43 U/L (ref 0–40)
BASOPHILS # BLD AUTO: 0.1 THOU/UL (ref 0–0.2)
BASOPHILS NFR BLD AUTO: 1 % (ref 0–2)
BILIRUB SERPL-MCNC: 1.1 MG/DL (ref 0–1)
BUN SERPL-MCNC: 16 MG/DL (ref 8–22)
CALCIUM SERPL-MCNC: 9.4 MG/DL (ref 8.5–10.5)
CHLORIDE BLD-SCNC: 105 MMOL/L (ref 98–107)
CO2 SERPL-SCNC: 30 MMOL/L (ref 22–31)
CREAT SERPL-MCNC: 0.77 MG/DL (ref 0.6–1.1)
EOSINOPHIL # BLD AUTO: 0.2 THOU/UL (ref 0–0.4)
EOSINOPHIL NFR BLD AUTO: 2 % (ref 0–6)
ERYTHROCYTE [DISTWIDTH] IN BLOOD BY AUTOMATED COUNT: 15 % (ref 11–14.5)
GFR SERPL CREATININE-BSD FRML MDRD: >60 ML/MIN/1.73M2
GLUCOSE BLD-MCNC: 98 MG/DL (ref 70–125)
HCT VFR BLD AUTO: 41.4 % (ref 35–47)
HGB BLD-MCNC: 13.1 G/DL (ref 12–16)
IMM GRANULOCYTES # BLD: 0.1 THOU/UL
IMM GRANULOCYTES NFR BLD: 1 %
LYMPHOCYTES # BLD AUTO: 2.5 THOU/UL (ref 0.8–4.4)
LYMPHOCYTES NFR BLD AUTO: 23 % (ref 20–40)
MCH RBC QN AUTO: 28.8 PG (ref 27–34)
MCHC RBC AUTO-ENTMCNC: 31.6 G/DL (ref 32–36)
MCV RBC AUTO: 91 FL (ref 80–100)
MONOCYTES # BLD AUTO: 0.5 THOU/UL (ref 0–0.9)
MONOCYTES NFR BLD AUTO: 5 % (ref 2–10)
NEUTROPHILS # BLD AUTO: 7.6 THOU/UL (ref 2–7.7)
NEUTROPHILS NFR BLD AUTO: 69 % (ref 50–70)
PLATELET # BLD AUTO: 250 THOU/UL (ref 140–440)
PMV BLD AUTO: 11.5 FL (ref 8.5–12.5)
POTASSIUM BLD-SCNC: 4 MMOL/L (ref 3.5–5)
PROT SERPL-MCNC: 7.9 G/DL (ref 6–8)
RBC # BLD AUTO: 4.55 MILL/UL (ref 3.8–5.4)
SODIUM SERPL-SCNC: 141 MMOL/L (ref 136–145)
WBC: 10.9 THOU/UL (ref 4–11)

## 2020-11-05 ASSESSMENT — MIFFLIN-ST. JEOR: SCORE: 2051.88

## 2020-11-08 ENCOUNTER — AMBULATORY - HEALTHEAST (OUTPATIENT)
Dept: FAMILY MEDICINE | Facility: CLINIC | Age: 45
End: 2020-11-08

## 2020-11-08 DIAGNOSIS — Z11.59 ENCOUNTER FOR SCREENING FOR OTHER VIRAL DISEASES: ICD-10-CM

## 2020-11-10 ENCOUNTER — COMMUNICATION - HEALTHEAST (OUTPATIENT)
Dept: FAMILY MEDICINE | Facility: CLINIC | Age: 45
End: 2020-11-10

## 2020-11-10 ENCOUNTER — COMMUNICATION - HEALTHEAST (OUTPATIENT)
Dept: SCHEDULING | Facility: CLINIC | Age: 45
End: 2020-11-10

## 2020-11-10 ENCOUNTER — ANESTHESIA - HEALTHEAST (OUTPATIENT)
Dept: SURGERY | Facility: AMBULATORY SURGERY CENTER | Age: 45
End: 2020-11-10

## 2020-11-11 ENCOUNTER — SURGERY - HEALTHEAST (OUTPATIENT)
Dept: SURGERY | Facility: AMBULATORY SURGERY CENTER | Age: 45
End: 2020-11-11

## 2020-11-11 ENCOUNTER — COMMUNICATION - HEALTHEAST (OUTPATIENT)
Dept: SCHEDULING | Facility: CLINIC | Age: 45
End: 2020-11-11

## 2020-11-19 ENCOUNTER — AMBULATORY - HEALTHEAST (OUTPATIENT)
Dept: NURSING | Facility: CLINIC | Age: 45
End: 2020-11-19

## 2020-12-03 ENCOUNTER — OFFICE VISIT - HEALTHEAST (OUTPATIENT)
Dept: OBGYN | Facility: CLINIC | Age: 45
End: 2020-12-03

## 2020-12-03 DIAGNOSIS — R03.0 ELEVATED BLOOD PRESSURE READING: ICD-10-CM

## 2020-12-03 DIAGNOSIS — Z98.890 POST-OPERATIVE STATE: ICD-10-CM

## 2020-12-04 ENCOUNTER — COMMUNICATION - HEALTHEAST (OUTPATIENT)
Dept: FAMILY MEDICINE | Facility: CLINIC | Age: 45
End: 2020-12-04

## 2020-12-16 ENCOUNTER — COMMUNICATION - HEALTHEAST (OUTPATIENT)
Dept: ADMINISTRATIVE | Facility: CLINIC | Age: 45
End: 2020-12-16

## 2020-12-21 ENCOUNTER — OFFICE VISIT - HEALTHEAST (OUTPATIENT)
Dept: FAMILY MEDICINE | Facility: CLINIC | Age: 45
End: 2020-12-21

## 2020-12-21 DIAGNOSIS — I10 BENIGN ESSENTIAL HYPERTENSION: ICD-10-CM

## 2020-12-21 DIAGNOSIS — F41.9 ANXIETY: ICD-10-CM

## 2021-01-18 ENCOUNTER — AMBULATORY - HEALTHEAST (OUTPATIENT)
Dept: LAB | Facility: CLINIC | Age: 46
End: 2021-01-18

## 2021-01-18 ENCOUNTER — OFFICE VISIT - HEALTHEAST (OUTPATIENT)
Dept: FAMILY MEDICINE | Facility: CLINIC | Age: 46
End: 2021-01-18

## 2021-01-18 DIAGNOSIS — E66.01 MORBID OBESITY (H): ICD-10-CM

## 2021-01-18 DIAGNOSIS — I10 BENIGN ESSENTIAL HYPERTENSION: ICD-10-CM

## 2021-01-18 DIAGNOSIS — F41.9 ANXIETY: ICD-10-CM

## 2021-01-18 DIAGNOSIS — R05.3 CHRONIC COUGH: ICD-10-CM

## 2021-01-18 DIAGNOSIS — R74.8 ELEVATED LIVER ENZYMES: ICD-10-CM

## 2021-01-18 LAB
ALBUMIN SERPL-MCNC: 3.9 G/DL (ref 3.5–5)
ALP SERPL-CCNC: 179 U/L (ref 45–120)
ALT SERPL W P-5'-P-CCNC: 63 U/L (ref 0–45)
ANION GAP SERPL CALCULATED.3IONS-SCNC: 11 MMOL/L (ref 5–18)
AST SERPL W P-5'-P-CCNC: 58 U/L (ref 0–40)
BILIRUB SERPL-MCNC: 1.1 MG/DL (ref 0–1)
BUN SERPL-MCNC: 17 MG/DL (ref 8–22)
CALCIUM SERPL-MCNC: 9.6 MG/DL (ref 8.5–10.5)
CHLORIDE BLD-SCNC: 103 MMOL/L (ref 98–107)
CO2 SERPL-SCNC: 25 MMOL/L (ref 22–31)
CREAT SERPL-MCNC: 0.74 MG/DL (ref 0.6–1.1)
GFR SERPL CREATININE-BSD FRML MDRD: >60 ML/MIN/1.73M2
GLUCOSE BLD-MCNC: 85 MG/DL (ref 70–125)
POTASSIUM BLD-SCNC: 4.5 MMOL/L (ref 3.5–5)
PROT SERPL-MCNC: 7.8 G/DL (ref 6–8)
SODIUM SERPL-SCNC: 139 MMOL/L (ref 136–145)

## 2021-01-19 ENCOUNTER — COMMUNICATION - HEALTHEAST (OUTPATIENT)
Dept: FAMILY MEDICINE | Facility: CLINIC | Age: 46
End: 2021-01-19

## 2021-02-10 ENCOUNTER — COMMUNICATION - HEALTHEAST (OUTPATIENT)
Dept: FAMILY MEDICINE | Facility: CLINIC | Age: 46
End: 2021-02-10

## 2021-02-10 DIAGNOSIS — E03.9 HYPOTHYROIDISM, UNSPECIFIED TYPE: ICD-10-CM

## 2021-02-10 DIAGNOSIS — R05.3 CHRONIC COUGH: ICD-10-CM

## 2021-02-10 DIAGNOSIS — E56.9 VITAMIN DEFICIENCY: ICD-10-CM

## 2021-05-18 ENCOUNTER — COMMUNICATION - HEALTHEAST (OUTPATIENT)
Dept: FAMILY MEDICINE | Facility: CLINIC | Age: 46
End: 2021-05-18

## 2021-05-18 DIAGNOSIS — K51.90 UC (ULCERATIVE COLITIS) (H): ICD-10-CM

## 2021-05-27 NOTE — TELEPHONE ENCOUNTER
Patient has a lab appt.  No orders available.  Please review and place orders needed.  Thank you  Lab

## 2021-05-27 NOTE — TELEPHONE ENCOUNTER
RN cannot approve Refill Request    RN can NOT refill this medication PCP messaged that patient is overdue for Labs and Office Visit.       Mil[facility]so Chew, [facility] Triage/Med Refill 4/1/2019    Requested Prescriptions   Pending Prescriptions Disp Refills     mesalamine (ASACOL HD) 800 mg TbEC EC tablet [Pharmacy Med Name: MESALAMINE 800MG DR TABLETS] 180 tablet 0     Sig: TAKE 1 TABLET BY MOUTH THREE TIMES DAILY    Mesalamine Refill Protocol for Crohns Failed - 3/31/2019 11:40 AM       Failed - LFT or AST or ALT in last year    Albumin   Date Value Ref Range Status   10/17/2017 3.6 3.5 - 5.0 g/dL Final     Bilirubin, Total   Date Value Ref Range Status   10/17/2017 0.9 0.0 - 1.0 mg/dL Final     Bilirubin, Direct   Date Value Ref Range Status   11/26/2014 0.3 <=0.5 mg/dL Final     Alkaline Phosphatase   Date Value Ref Range Status   10/17/2017 135 (H) 45 - 120 U/L Final     AST   Date Value Ref Range Status   10/17/2017 27 0 - 40 U/L Final     ALT   Date Value Ref Range Status   10/17/2017 23 0 - 45 U/L Final     Protein, Total   Date Value Ref Range Status   10/17/2017 7.7 6.0 - 8.0 g/dL Final               Failed - Visit with PCP or prescribing provider visit in last 6 months or next 3 months    Last office visit with prescriber/PCP: Visit date not found OR same dept: Visit date not found OR same specialty: 1/4/2019 Sherri Ellis MD Last physical: Visit date not found Last MTM visit: Visit date not found     Next appt within 3 mo: Visit date not found  Next physical within 3 mo: Visit date not found  Prescriber OR PCP: Kimberly Montano MD  Last diagnosis associated with med order: 1. UC (ulcerative colitis) (H)  - mesalamine (ASACOL HD) 800 mg TbEC EC tablet [Pharmacy Med Name: MESALAMINE 800MG DR TABLETS]; TAKE 1 TABLET BY MOUTH THREE TIMES DAILY  Dispense: 180 tablet; Refill: 0    If protocol passes may refill for 6 months if within 3 months of last provider visit (or a total of 9  months).             Failed - CBC (Hemogram 2) in last year     WBC   Date Value Ref Range Status   10/17/2017 7.6 4.0 - 11.0 thou/uL Final     RBC   Date Value Ref Range Status   10/17/2017 4.35 3.80 - 5.40 mill/uL Final     Hemoglobin   Date Value Ref Range Status   10/17/2017 11.8 (L) 12.0 - 16.0 g/dL Final     Hematocrit   Date Value Ref Range Status   10/17/2017 36.1 35.0 - 47.0 % Final     MCV   Date Value Ref Range Status   10/17/2017 83 80 - 100 fL Final     MCH   Date Value Ref Range Status   10/17/2017 27.1 27.0 - 34.0 pg Final     MCHC   Date Value Ref Range Status   10/17/2017 32.7 32.0 - 36.0 g/dL Final     RDW   Date Value Ref Range Status   10/17/2017 15.2 (H) 11.0 - 14.5 % Final     Platelets   Date Value Ref Range Status   10/17/2017 311 140 - 440 thou/uL Final     MPV   Date Value Ref Range Status   10/17/2017 7.8 7.0 - 10.0 fL Final               Passed - Serum Creatinine in the last year    Creatinine   Date Value Ref Range Status   02/18/2019 0.70 0.60 - 1.10 mg/dL Final

## 2021-05-28 ENCOUNTER — RECORDS - HEALTHEAST (OUTPATIENT)
Dept: ADMINISTRATIVE | Facility: CLINIC | Age: 46
End: 2021-05-28

## 2021-05-30 VITALS — WEIGHT: 268.8 LBS | BODY MASS INDEX: 40.28 KG/M2

## 2021-05-31 ENCOUNTER — RECORDS - HEALTHEAST (OUTPATIENT)
Dept: ADMINISTRATIVE | Facility: CLINIC | Age: 46
End: 2021-05-31

## 2021-05-31 VITALS — BODY MASS INDEX: 39.04 KG/M2 | WEIGHT: 257.6 LBS | HEIGHT: 68 IN

## 2021-06-01 VITALS — HEIGHT: 68 IN | WEIGHT: 276 LBS | BODY MASS INDEX: 41.83 KG/M2

## 2021-06-01 VITALS — HEIGHT: 68 IN | BODY MASS INDEX: 42.13 KG/M2 | WEIGHT: 278 LBS

## 2021-06-02 VITALS — WEIGHT: 292.9 LBS | BODY MASS INDEX: 44.39 KG/M2 | HEIGHT: 68 IN

## 2021-06-02 NOTE — TELEPHONE ENCOUNTER
Medication Request    Medication name:   levothyroxine (SYNTHROID, LEVOTHROID) 112 MCG tablet 180 tablet 1 3/13/2019     Sig - Route: Take 2 tablets (224 mcg total) by mouth daily. - Oral        Pharmacy Name and Location: [facility][id number] [address] [place] [address] AVE [address] AT [facility]    Reason for request:   Patient is out of medication for 4 days.  Has a CPE 11/18/19  Please give patient one month refill.    Thank you    When did you use medication last?:  10/28/19    Patient offered appointment:  CPE 11/18/19     Okay to leave a detailed message: yes

## 2021-06-02 NOTE — TELEPHONE ENCOUNTER
RN cannot approve Refill Request    RN can NOT refill this medication PCP messaged that patient is overdue for Labs and Office Visit. Last office visit: Visit date not found Last Physical: 8/14/2018 Last MTM visit: Visit date not found Last visit same specialty: 1/4/2019 Sherri Ellis MD.  Next visit within 3 mo: Visit date not found  Next physical within 3 mo: Visit date not found      Yamilet Jimenez, [facility] Connection Triage/Med Refill 10/22/2019    Requested Prescriptions   Pending Prescriptions Disp Refills     mesalamine (ASACOL HD) 800 mg TbEC EC tablet [Pharmacy Med Name: MESALAMINE 800MG DR TABLETS] 180 tablet 0     Sig: TAKE 1 TABLET BY MOUTH THREE TIMES DAILY       Mesalamine Refill Protocol for Crohns Failed - 10/21/2019  5:02 PM        Failed - Visit with PCP or prescribing provider visit in last 6 months or next 3 months     Last office visit with prescriber/PCP: Visit date not found OR same dept: Visit date not found OR same specialty: 1/4/2019 Sherri Ellis MD Last physical: Visit date not found Last MTM visit: Visit date not found     Next appt within 3 mo: Visit date not found  Next physical within 3 mo: Visit date not found  Prescriber OR PCP: Kimberly Montano MD  Last diagnosis associated with med order: 1. UC (ulcerative colitis) (H)  - mesalamine (ASACOL HD) 800 mg TbEC EC tablet [Pharmacy Med Name: MESALAMINE 800MG DR TABLETS]; TAKE 1 TABLET BY MOUTH THREE TIMES DAILY  Dispense: 180 tablet; Refill: 0    If protocol passes may refill for 6 months if within 3 months of last provider visit (or a total of 9 months).              Failed - CBC (Hemogram 2) in last year      WBC   Date Value Ref Range Status   10/17/2017 7.6 4.0 - 11.0 thou/uL Final     RBC   Date Value Ref Range Status   10/17/2017 4.35 3.80 - 5.40 mill/uL Final     Hemoglobin   Date Value Ref Range Status   10/17/2017 11.8 (L) 12.0 - 16.0 g/dL Final     Hematocrit   Date Value Ref Range Status    10/17/2017 36.1 35.0 - 47.0 % Final     MCV   Date Value Ref Range Status   10/17/2017 83 80 - 100 fL Final     MCH   Date Value Ref Range Status   10/17/2017 27.1 27.0 - 34.0 pg Final     MCHC   Date Value Ref Range Status   10/17/2017 32.7 32.0 - 36.0 g/dL Final     RDW   Date Value Ref Range Status   10/17/2017 15.2 (H) 11.0 - 14.5 % Final     Platelets   Date Value Ref Range Status   10/17/2017 311 140 - 440 thou/uL Final     MPV   Date Value Ref Range Status   10/17/2017 7.8 7.0 - 10.0 fL Final                Passed - LFT or AST or ALT in last year     Albumin   Date Value Ref Range Status   04/09/2019 3.9 3.5 - 5.0 g/dL Final     Bilirubin, Total   Date Value Ref Range Status   04/09/2019 0.6 0.0 - 1.0 mg/dL Final     Bilirubin, Direct   Date Value Ref Range Status   11/26/2014 0.3 <=0.5 mg/dL Final     Alkaline Phosphatase   Date Value Ref Range Status   04/09/2019 137 (H) 45 - 120 U/L Final     AST   Date Value Ref Range Status   04/09/2019 36 0 - 40 U/L Final     ALT   Date Value Ref Range Status   04/09/2019 42 0 - 45 U/L Final     Protein, Total   Date Value Ref Range Status   04/09/2019 7.6 6.0 - 8.0 g/dL Final                Passed - Serum Creatinine in the last year     Creatinine   Date Value Ref Range Status   04/09/2019 0.70 0.60 - 1.10 mg/dL Final

## 2021-06-03 NOTE — TELEPHONE ENCOUNTER
Prescribed by Endo     levothyroxine (SYNTHROID, LEVOTHROID) 112 MCG tablet 180 tablet 1 3/13/2019  --   Sig - Route: Take 2 tablets (224 mcg total) by mouth daily. - Oral   Sent to pharmacy as: levothyroxine (SYNTHROID, LEVOTHROID) 112 MCG tablet   Cosign for Ordering: Accepted by Cruz Phelan MD on 3/18/2019  4:13 AM   E-Prescribing Status: Receipt confirmed by pharmacy (3/13/2019  3:35 PM CDT)     Routing request

## 2021-06-04 VITALS
WEIGHT: 293 LBS | BODY MASS INDEX: 43.4 KG/M2 | SYSTOLIC BLOOD PRESSURE: 122 MMHG | DIASTOLIC BLOOD PRESSURE: 78 MMHG | HEART RATE: 66 BPM | HEIGHT: 69 IN | OXYGEN SATURATION: 98 %

## 2021-06-04 VITALS
WEIGHT: 293 LBS | HEART RATE: 63 BPM | OXYGEN SATURATION: 97 % | BODY MASS INDEX: 43.4 KG/M2 | SYSTOLIC BLOOD PRESSURE: 140 MMHG | DIASTOLIC BLOOD PRESSURE: 86 MMHG | HEIGHT: 69 IN | TEMPERATURE: 97.7 F

## 2021-06-04 NOTE — PROGRESS NOTES
Result is/are normal.  Treatment plan to continue as discussed in clinic.  Continue current medications if on any.  Call if any questions or concerns.

## 2021-06-05 VITALS
DIASTOLIC BLOOD PRESSURE: 90 MMHG | HEART RATE: 73 BPM | BODY MASS INDEX: 45.89 KG/M2 | SYSTOLIC BLOOD PRESSURE: 138 MMHG | WEIGHT: 293 LBS | OXYGEN SATURATION: 98 %

## 2021-06-05 VITALS
BODY MASS INDEX: 46.04 KG/M2 | WEIGHT: 293 LBS | HEART RATE: 67 BPM | SYSTOLIC BLOOD PRESSURE: 176 MMHG | DIASTOLIC BLOOD PRESSURE: 100 MMHG

## 2021-06-05 VITALS
HEART RATE: 68 BPM | TEMPERATURE: 97.6 F | OXYGEN SATURATION: 96 % | DIASTOLIC BLOOD PRESSURE: 94 MMHG | HEIGHT: 68 IN | SYSTOLIC BLOOD PRESSURE: 140 MMHG | WEIGHT: 293 LBS | BODY MASS INDEX: 44.41 KG/M2

## 2021-06-05 VITALS
WEIGHT: 293 LBS | HEART RATE: 70 BPM | OXYGEN SATURATION: 99 % | SYSTOLIC BLOOD PRESSURE: 132 MMHG | DIASTOLIC BLOOD PRESSURE: 88 MMHG | BODY MASS INDEX: 45.48 KG/M2

## 2021-06-07 NOTE — TELEPHONE ENCOUNTER
Patient has appointment for 5/21- asking for bridge for medication until appointment.    mesalamine (ASACOL HD) 800 mg TbEC EC tablet  180 tablet  0  10/22/2019   No    Sig: TAKE 1 TABLET BY MOUTH THREE TIMES DAILY    Sent to pharmacy as: mesalamine 800 mg tablet,delayed release (ASACOL HD)    E-Prescribing Status: Receipt confirmed by pharmacy (10/22/2019  5:25 PM CDT)      Please advise on refill    Teed up medication for provider signature- please adjust as needed

## 2021-06-07 NOTE — TELEPHONE ENCOUNTER
Left message for pt to call back. Pt is due for a med check appointment. Please assist pt in scheduling a video visit with PCP or an in clinic office visit with another provider.

## 2021-06-07 NOTE — TELEPHONE ENCOUNTER
Who is calling:  Patient  Reason for Call:  Pt calling to make sure she is getting her refill as that is what triggered needing a med check appointment.  Pt is scheduled for 05/21 which is 3 weeks out can she get a refill to last at least until appointment?  Pharmacy on file.  Please advise.  Date of last appointment with primary care: N/A  Okay to leave a detailed message: Yes

## 2021-06-07 NOTE — TELEPHONE ENCOUNTER
Upcoming Appointment Question  When is the appointment: 5/21 9:00 AM Tried to schedule  What is your appointment for?: Medication Review  Who is your appointment scheduled with?: PCP only  What is your question/concern?: Writer cannot schedule Video for patient on 5/21 at 9:00 am. Please advise for video visit.   Okay to leave a detailed message?: Yes

## 2021-06-07 NOTE — TELEPHONE ENCOUNTER
Called pt again and pt explained that she received my message last night and called back and scheduled appointment (no documentation of this on this message). Pt is scheduled for May 21st.

## 2021-06-07 NOTE — TELEPHONE ENCOUNTER
RN cannot approve Refill Request    RN can NOT refill this medication PCP messaged that patient is overdue for Labs. Last office visit: Visit date not found Last Physical: Visit date not found Last MTM visit: Visit date not found Last visit same specialty: 1/4/2019 Sherri Ellis MD.  Next visit within 3 mo: Visit date not found  Next physical within 3 mo: Visit date not found      Odessa Camacho, [facility] Connection Triage/Med Refill 5/2/2020    Requested Prescriptions   Pending Prescriptions Disp Refills     mesalamine (ASACOL HD) 800 mg TbEC EC tablet [Pharmacy Med Name: MESALAMINE 800MG DR TABLETS] 180 tablet 0     Sig: TAKE 1 TABLET BY MOUTH THREE TIMES DAILY       Mesalamine Refill Protocol for Crohns Failed - 5/1/2020 10:21 AM        Failed - LFT or AST or ALT in last year     Albumin   Date Value Ref Range Status   04/09/2019 3.9 3.5 - 5.0 g/dL Final     Bilirubin, Total   Date Value Ref Range Status   04/09/2019 0.6 0.0 - 1.0 mg/dL Final     Bilirubin, Direct   Date Value Ref Range Status   11/26/2014 0.3 <=0.5 mg/dL Final     Alkaline Phosphatase   Date Value Ref Range Status   04/09/2019 137 (H) 45 - 120 U/L Final     AST   Date Value Ref Range Status   04/09/2019 36 0 - 40 U/L Final     ALT   Date Value Ref Range Status   04/09/2019 42 0 - 45 U/L Final     Protein, Total   Date Value Ref Range Status   04/09/2019 7.6 6.0 - 8.0 g/dL Final                Failed - Serum Creatinine in the last year     Creatinine   Date Value Ref Range Status   04/09/2019 0.70 0.60 - 1.10 mg/dL Final             Passed - Visit with PCP or prescribing provider visit in last 6 months or next 3 months     Last office visit with prescriber/PCP: Visit date not found OR same dept: Visit date not found OR same specialty: 1/4/2019 Sherri Ellis MD Last physical: Visit date not found Last MTM visit: Visit date not found     Next appt within 3 mo: Visit date not found  Next physical within 3 mo: Visit date not  found  Prescriber OR PCP: Aurora Marie PA-C  Last diagnosis associated with med order: 1. UC (ulcerative colitis) (H)  - mesalamine (ASACOL HD) 800 mg TbEC EC tablet [Pharmacy Med Name: MESALAMINE 800MG DR TABLETS]; TAKE 1 TABLET BY MOUTH THREE TIMES DAILY  Dispense: 180 tablet; Refill: 0    If protocol passes may refill for 6 months if within 3 months of last provider visit (or a total of 9 months).              Passed - CBC (Hemogram 2) in last year      WBC   Date Value Ref Range Status   12/30/2019 10.2 4.0 - 11.0 thou/uL Final     RBC   Date Value Ref Range Status   12/30/2019 4.37 3.80 - 5.40 mill/uL Final     Hemoglobin   Date Value Ref Range Status   12/30/2019 13.1 12.0 - 16.0 g/dL Final     Hematocrit   Date Value Ref Range Status   12/30/2019 39.4 35.0 - 47.0 % Final     MCV   Date Value Ref Range Status   12/30/2019 90 80 - 100 fL Final     MCH   Date Value Ref Range Status   12/30/2019 29.9 27.0 - 34.0 pg Final     MCHC   Date Value Ref Range Status   12/30/2019 33.2 32.0 - 36.0 g/dL Final     RDW   Date Value Ref Range Status   12/30/2019 13.1 11.0 - 14.5 % Final     Platelets   Date Value Ref Range Status   12/30/2019 226 140 - 440 thou/uL Final     MPV   Date Value Ref Range Status   12/30/2019 8.5 7.0 - 10.0 fL Final

## 2021-06-08 NOTE — PROGRESS NOTES
ASSESSMENT:  1. Dysuria  Urinalysis-UC if Indicated    Culture, Urine     Results for orders placed or performed in visit on 01/03/17   Urinalysis-UC if Indicated   Result Value Ref Range    Color, UA Yellow Colorless, Yellow, Straw, Light Yellow    Clarity, UA Slightly Cloudy (!) Clear    Glucose, UA Negative Negative    Bilirubin, UA Negative Negative    Ketones, UA Negative Negative    Specific Gravity, UA 1.025 1.002 - 1.030    Blood, UA Moderate (!) Negative    pH, UA 5.5 4.5 - 8.0    Protein,  mg/dL (!) Negative mg/dL    Urobilinogen, UA 0.2 E.U./dL 0.2 E.U./dL, 1.0 E.U./dL    Nitrite, UA Positive (!) Negative    Leukocytes, UA Moderate (!) Negative    Bacteria, UA Many (!) None Seen hpf    RBC, UA 5-10 (!) None Seen, 0-2 hpf    WBC, UA  (!) None Seen, 0-5 hpf    Squam Epithel, UA 10-25 (!) None Seen, 0-5 lpf         PLAN:  Urinary tract infection  Macrobid prescribed  Advised adequate rest and hydration with a minimum of 2-3 liters non-caffienated beverage per day. Advised OTC Uristat or Azo for symptom relief for the first 48 hours. Follow-up with primary for persistence or worsening of symptoms. Advised if develops fever, chills, nausea, vomiting, or flank pain,return to the clinic.     SUBJECTIVE:   Sherry Yates is a 41 y.o. female presents with 4 days complaint of urinary frequency. Associated symptoms are: nausea and pelvic pressure. Denies hematuria, fever, chills, flank pain on bilateral and vomiting.  She does not have STD concerns. Declines testing.  No history of pyelonephritis or kidney stones.  Patient has not tried any OTC treatment for her symptoms.    No past medical history on file.    Current Medications:  Current Outpatient Prescriptions on File Prior to Visit   Medication Sig Dispense Refill     levothyroxine (SYNTHROID, LEVOTHROID) 200 MCG tablet Take 1 tablet (200 mcg total) by mouth daily. 30 tablet 11     levothyroxine (SYNTHROID, LEVOTHROID) 25 MCG tablet Take 1 tablet (25  mcg total) by mouth daily. Take 1 tablet daily with Levothyroxine 200 mcg 30 tablet 11     multivitamin with minerals (THERA-M) 9 mg iron-400 mcg Tab tablet Take 1 tablet by mouth daily.       ursodiol (ACTIGALL) 300 mg capsule 600 mg 2 (two) times a day.       bromocriptine (PARLODEL) 2.5 mg tablet TAKE ONE TABLET BY MOUTH TWICE DAILY 180 tablet 0     No current facility-administered medications on file prior to visit.        Allergies:  Allergies   Allergen Reactions     Codeine        OBJECTIVE:  Visit Vitals     /88     Pulse 63     Temp 98.6  F (37  C) (Oral)     Resp 16     Wt (!) 268 lb 12.8 oz (121.9 kg)     LMP 12/18/2016     SpO2 97%     Breastfeeding No     BMI 40.28 kg/m2       Physical exam reveals a pleasant 41 y.o. female   Appears healthy, alert and cooperative  Lungs: Chest is clear, no wheezing or rales. Symmetric air entry throughout both lung fields.  Cardiac: regular rate and rhythm, no murmur rub or gallop  Abdomen: soft, no hepatosplenomegaly. Mild pelvic tenderness with palpation. No CVA tenderness

## 2021-06-08 NOTE — PROGRESS NOTES
"Sherry Yates is a 45 y.o. female who is being evaluated via a billable video visit.      The patient has been notified of following:     \"This video visit will be conducted via a call between you and your physician/provider. We have found that certain health care needs can be provided without the need for an in-person physical exam.  This service lets us provide the care you need with a video conversation.  If a prescription is necessary we can send it directly to your pharmacy.  If lab work is needed we can place an order for that and you can then stop by our lab to have the test done at a later time.    Video visits are billed at different rates depending on your insurance coverage. Please reach out to your insurance provider with any questions.    If during the course of the call the physician/provider feels a video visit is not appropriate, you will not be charged for this service.\"    Patient has given verbal consent to a Video visit? Yes    Patient would like to receive their AVS by AVS Preference: Ara.    Patient would like the video invitation sent by: Send to e-mail at: [email or web address]    Will anyone else be joining your video visit? No        Video Start Time: 9:20 AM    Family Medicine Office Visit  [facility]  Patient Name: Sherry Yates  Patient Age: 45 y.o.  YOB: 1975  MRN: [id number]    Date of Visit: 2020  Reason for Office Visit:   Chief Complaint   Patient presents with     Med Check     review meds            Assessment / Plan / Medical Decision Makin. Hypothyroidism, unspecified type  Will repeat labs at annual exam in .  Stable and no changes to medications.  Reviewed recent labs    2. Ulcerative colitis without complications, unspecified location (H)  Stable.  Continue to monitor diet and medication    3. Morbid obesity (H)  Discussed diet and exercise.  Recommend food journal for the next month and 30 min walk " daily.    4. Prolactinoma (H)  Stable.  Will repeat labs with routine annual.        Health Maintenance Review  Health Maintenance   Topic Date Due     HIV SCREENING  03/30/1988     PREVENTIVE CARE VISIT  11/18/2020     MAMMOGRAM  12/30/2020     PAP SMEAR  08/01/2021     HPV TEST  08/01/2021     ADVANCE CARE PLANNING  11/18/2024     LIPID  12/30/2024     TD 18+ HE  11/18/2029     INFLUENZA VACCINE RULE BASED  Completed     TDAP ADULT ONE TIME DOSE  Completed         I am having Sherry Yates maintain her levothyroxine, ergocalciferol, and mesalamine.      HPI:  Sherry Yates is a 45 y.o. year old who presents for a video visit today for follow up.  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  Not needing additional support - feels like good support system.  Feels like thyroid is working well, no changes in anything.  No changes in bowels.  UC doing ok - recently got refill on mesalamine.  No HA, sleeping ok.       Still problems with weight.  Previously did WW and did well - walking about 3 miles a day and lost 50lbs.  That has changed with covid.  Eating better but admits to not cooking.  Not eating out.  Back to drinking pop.      Pain in the foot - seen by orthopedics.  Achilles tendinosis in both feet.  Left worse than the right.  Different pain points for each foot.  Went to PT and doing stretches.  Noticed that not walking so less pain.  Realized needing new shoes.        Review of Systems- pertinent positive in bold:  Constitutional: Fever, chills, night sweats, fainting, weight change, fatigue, seizures, dizziness, sleeping difficulties, loud snoring/pauses in breathing  Eyes: change in vision, blurred or double vision, redness/eye pain  Ears, nose, mouth, throat: change in hearing, ear pain, hoarseness, difficulty swallowing, sores in the mouth or throat  Respiratory: shortness of breath, cough, bloody sputum, wheezing  Cardiovascular: chest pain, palpitations    Gastrointestinal: abdominal pain, heartburn/indigestion, nausea/vomiting, change in appetite, change in bowel habits, constipation or diarrhea, rectal bleeding/dark stools, difficulty swallowing  Urinary: painful urination, frequent urination, urinary urgency/incontinence, blood in urine/dark urine, nocturia  Musculoskeletal: backache/back pain (new or increasing), weakness, joint pain/stiffness (new or increasing), muscle cramps, swelling of hands, feet, ankles, leg pain/redness  Skin: change in moles/freckles, rash, nodules  Hematologic/lymphatic: swollen lymph glands, abnormal bruising/bleeding  Endocrine: excessive thirst/urination, cold or heat intolerance  Neurologic/emotional: worrisome memory change, numbness/tingling, anxiety, mood swings      Current Scheduled Meds:  Outpatient Encounter Medications as of 5/21/2020   Medication Sig Dispense Refill     ergocalciferol (ERGOCALCIFEROL) 50,000 unit capsule Take 1 capsule (50,000 Units total) by mouth once a week. 12 capsule 1     levothyroxine (SYNTHROID, LEVOTHROID) 112 MCG tablet Take 2 tablets (224 mcg total) by mouth daily. 180 tablet 1     mesalamine (ASACOL HD) 800 mg TbEC EC tablet Take 1 tablet (800 mg total) by mouth 3 (three) times a day. 180 tablet 0     No facility-administered encounter medications on file as of 5/21/2020.      Past Medical History:   Diagnosis Date     Adenoma of pituitary (H)      Inverted nipple     Bilat and normal      PSC (primary sclerosing cholangitis)      Ulcerative colitis (H)      Past Surgical History:   Procedure Laterality Date     EXPLORATORY LAPAROTOMY       Social History     Tobacco Use     Smoking status: Never Smoker     Smokeless tobacco: Never Used     Tobacco comment: no exposure   Substance Use Topics     Alcohol use: No     Drug use: No       Objective / Physical Examination:  There were no vitals filed for this visit.  Wt Readings from Last 3 Encounters:   11/18/19 (!) 297 lb 6.4 oz (134.9 kg)    01/04/19 (!) 292 lb 14.4 oz (132.9 kg)   08/14/18 (!) 278 lb (126.1 kg)     BP Readings from Last 3 Encounters:   11/18/19 140/86   01/04/19 128/60   08/14/18 126/90     There is no height or weight on file to calculate BMI.   Results for orders placed or performed in visit on 12/30/19   Thyroid Cascade   Result Value Ref Range    TSH 0.45 0.30 - 5.00 uIU/mL   Lipid Cascade RANDOM   Result Value Ref Range    Cholesterol 189 <=199 mg/dL    Triglycerides 135 <=149 mg/dL    HDL Cholesterol 53 >=50 mg/dL    LDL Calculated 109 <=129 mg/dL    Patient Fasting > 8hrs? Yes    Vitamin D, Total (25-Hydroxy)   Result Value Ref Range    Vitamin D, Total (25-Hydroxy) 27.0 (L) 30.0 - 80.0 ng/mL   HM1 (CBC with Diff)   Result Value Ref Range    WBC 10.2 4.0 - 11.0 thou/uL    RBC 4.37 3.80 - 5.40 mill/uL    Hemoglobin 13.1 12.0 - 16.0 g/dL    Hematocrit 39.4 35.0 - 47.0 %    MCV 90 80 - 100 fL    MCH 29.9 27.0 - 34.0 pg    MCHC 33.2 32.0 - 36.0 g/dL    RDW 13.1 11.0 - 14.5 %    Platelets 226 140 - 440 thou/uL    MPV 8.5 7.0 - 10.0 fL    Neutrophils % 70 50 - 70 %    Lymphocytes % 21 20 - 40 %    Monocytes % 6 2 - 10 %    Eosinophils % 3 0 - 6 %    Basophils % 1 0 - 2 %    Neutrophils Absolute 7.2 2.0 - 7.7 thou/uL    Lymphocytes Absolute 2.1 0.8 - 4.4 thou/uL    Monocytes Absolute 0.6 0.0 - 0.9 thou/uL    Eosinophils Absolute 0.3 0.0 - 0.4 thou/uL    Basophils Absolute 0.1 0.0 - 0.2 thou/uL         GENERAL: Healthy, alert and no distress  EYES: Eyes grossly normal to inspection. No discharge or erythema, or obvious scleral/conjunctival abnormalities.  RESP: No audible wheeze, cough, or visible cyanosis.  No visible retractions or increased work of breathing.    NEURO: Cranial nerves grossly intact. Mentation and speech appropriate for age.  PSYCH: Mentation appears normal, affect normal/bright, judgement and insight intact, normal speech and appearance well-groomed        No orders of the defined types were placed in this  encounter.  Followup: Return in about 4 weeks (around 6/18/2020) for Recheck. earlier if needed.    Total time spent with patient was 20 min with >50% of time spent in face-to-face counseling regarding the above plan       Kimberly Montano MD        Video-Visit Details    Type of service:  Video Visit    Video End Time (time video stopped): 9:38 AM  Originating Location (pt. Location): Home    Distant Location (provider location):  [facility]     Platform used for Video Visit: [facility]      Kimberly Montano MD

## 2021-06-09 NOTE — PROGRESS NOTES
Family Medicine Office Visit  [facility]  Patient Name: Sherry Yates  Patient Age: 45 y.o.  YOB: 1975  MRN: [id number]    Date of Visit: 2020  Reason for Office Visit:   Chief Complaint   Patient presents with     Med Check     Mesalamine and Levothyroxine- no refills needed at this time            Assessment / Plan / Medical Decision Makin. UC (ulcerative colitis) (H)  Doing well.  On mesalamine and no issues.  No exacerbations and will continue to monitor    2. Hypothyroidism, unspecified type  Will check thyroid and see if any changes  - Thyroid Cascade    3. Vitamin deficiency  Completed course of vitamin d and will recheck today  - Vitamin D, Total (25-Hydroxy)    4. Prolactinoma (H)  Due for yearly prolactin levels  - Prolactin    5. Routine general medical examination at a health care facility  Will check routine labs and call with the results  - Comprehensive Metabolic Panel  - Lipid Cascade RANDOM    6. Tinea corporis  Trial of ketoconazole and if no improvement then will trial pill  - ketoconazole (NIZORAL) 2 % cream; Apply topically 2 (two) times a day.  Dispense: 15 g; Refill: 0        Health Maintenance Review  Health Maintenance   Topic Date Due     HIV SCREENING  1988     INFLUENZA VACCINE RULE BASED (1) 2020     PREVENTIVE CARE VISIT  2020     MAMMOGRAM  2020     PAP SMEAR  2021     HPV TEST  2021     ADVANCE CARE PLANNING  2024     LIPID  2024     TD 18+ HE  2029     TDAP ADULT ONE TIME DOSE  Completed         I am having Sherry Yates start on ketoconazole. I am also having her maintain her ergocalciferol, mesalamine, and levothyroxine.      HPI:  Sherry Yates is a 45 y.o. year old who presents to the office today for follow up on medications.  Doing well on medications.  Planning on going back to [employer].  Trying to get some exercise - walking every Saturday.  Rash on the right  inner thigh.  Ongoing for a few months and used lamisil at home OTC.  Itchy at first.  No ulcerations.    Review of Systems- pertinent positive in bold:  Constitutional: Fever, chills, night sweats, fainting, weight change, fatigue, seizures, dizziness, sleeping difficulties, loud snoring/pauses in breathing  Eyes: change in vision, blurred or double vision, redness/eye pain  Ears, nose, mouth, throat: change in hearing, ear pain, hoarseness, difficulty swallowing, sores in the mouth or throat  Respiratory: shortness of breath, cough, bloody sputum, wheezing  Cardiovascular: chest pain, palpitations   Gastrointestinal: abdominal pain, heartburn/indigestion, nausea/vomiting, change in appetite, change in bowel habits, constipation or diarrhea, rectal bleeding/dark stools, difficulty swallowing  Urinary: painful urination, frequent urination, urinary urgency/incontinence, blood in urine/dark urine, nocturia  Musculoskeletal: backache/back pain (new or increasing), weakness, joint pain/stiffness (new or increasing), muscle cramps, swelling of hands, feet, ankles, leg pain/redness  Skin: change in moles/freckles, rash, nodules  Hematologic/lymphatic: swollen lymph glands, abnormal bruising/bleeding  Endocrine: excessive thirst/urination, cold or heat intolerance  Neurologic/emotional: worrisome memory change, numbness/tingling, anxiety, mood swings      Current Scheduled Meds:  Outpatient Encounter Medications as of 7/13/2020   Medication Sig Dispense Refill     levothyroxine (SYNTHROID, LEVOTHROID) 112 MCG tablet TAKE 2 TABLETS(224 MCG) BY MOUTH DAILY 180 tablet 1     mesalamine (ASACOL HD) 800 mg TbEC EC tablet Take 1 tablet (800 mg total) by mouth 3 (three) times a day. 180 tablet 0     ergocalciferol (ERGOCALCIFEROL) 50,000 unit capsule Take 1 capsule (50,000 Units total) by mouth once a week. 12 capsule 1     ketoconazole (NIZORAL) 2 % cream Apply topically 2 (two) times a day. 15 g 0     No facility-administered  "encounter medications on file as of 7/13/2020.      Past Medical History:   Diagnosis Date     Adenoma of pituitary (H)      Inverted nipple     Bilat and normal      PSC (primary sclerosing cholangitis)      Ulcerative colitis (H)      Past Surgical History:   Procedure Laterality Date     EXPLORATORY LAPAROTOMY       Social History     Tobacco Use     Smoking status: Never Smoker     Smokeless tobacco: Never Used     Tobacco comment: no exposure   Substance Use Topics     Alcohol use: No     Drug use: No       Objective / Physical Examination:  Vitals:    07/13/20 0914   BP: 122/78   Patient Site: Right Arm   Patient Position: Sitting   Cuff Size: Adult Regular   Pulse: 66   SpO2: 98%   Weight: (!) 300 lb 9.6 oz (136.4 kg)   Height: 5' 9\" (1.753 m)     Wt Readings from Last 3 Encounters:   07/13/20 (!) 300 lb 9.6 oz (136.4 kg)   11/18/19 (!) 297 lb 6.4 oz (134.9 kg)   01/04/19 (!) 292 lb 14.4 oz (132.9 kg)     BP Readings from Last 3 Encounters:   07/13/20 122/78   11/18/19 140/86   01/04/19 128/60     Body mass index is 44.39 kg/m .   Results for orders placed or performed in visit on 12/30/19   Thyroid Cascade   Result Value Ref Range    TSH 0.45 0.30 - 5.00 uIU/mL   Lipid Cascade RANDOM   Result Value Ref Range    Cholesterol 189 <=199 mg/dL    Triglycerides 135 <=149 mg/dL    HDL Cholesterol 53 >=50 mg/dL    LDL Calculated 109 <=129 mg/dL    Patient Fasting > 8hrs? Yes    Vitamin D, Total (25-Hydroxy)   Result Value Ref Range    Vitamin D, Total (25-Hydroxy) 27.0 (L) 30.0 - 80.0 ng/mL   HM1 (CBC with Diff)   Result Value Ref Range    WBC 10.2 4.0 - 11.0 thou/uL    RBC 4.37 3.80 - 5.40 mill/uL    Hemoglobin 13.1 12.0 - 16.0 g/dL    Hematocrit 39.4 35.0 - 47.0 %    MCV 90 80 - 100 fL    MCH 29.9 27.0 - 34.0 pg    MCHC 33.2 32.0 - 36.0 g/dL    RDW 13.1 11.0 - 14.5 %    Platelets 226 140 - 440 thou/uL    MPV 8.5 7.0 - 10.0 fL    Neutrophils % 70 50 - 70 %    Lymphocytes % 21 20 - 40 %    Monocytes % 6 2 - 10 %    " Eosinophils % 3 0 - 6 %    Basophils % 1 0 - 2 %    Neutrophils Absolute 7.2 2.0 - 7.7 thou/uL    Lymphocytes Absolute 2.1 0.8 - 4.4 thou/uL    Monocytes Absolute 0.6 0.0 - 0.9 thou/uL    Eosinophils Absolute 0.3 0.0 - 0.4 thou/uL    Basophils Absolute 0.1 0.0 - 0.2 thou/uL           General Appearance: Alert and oriented, cooperative, affect appropriate, speech clear, in no apparent distress  Head: Normocephalic, atraumatic  Lungs: Clear to auscultation bilaterally. Normal inspiratory and expiratory effort  Cardiovascular: Regular rate, normal S1, S2. No murmurs, rubs, or gallops  Abdomen: Bowel sounds active all four quadrants. Soft, non-tender. No hepatomegaly or splenomegaly. No bruits detected.   Extremities: Pulses 2+ and equal throughout. No edema. Strength equal throughout.  Integumentary: Warm and dry. Without suspicious looking lesions, discoloration well demarcated with erythema and scaling on the left inner thigh, no ulcerations.  Neuro: Alert and oriented, follows commands appropriately.     Orders Placed This Encounter   Procedures     Thyroid Cascade     Prolactin     Comprehensive Metabolic Panel     Lipid Cascade RANDOM     Vitamin D, Total (25-Hydroxy)   Followup: Return in about 6 months (around 1/13/2021) for Recheck. earlier if needed.    Total time spent with patient was 30 min with >50% of time spent in face-to-face counseling regarding the above plan       Kimberly Montano MD

## 2021-06-09 NOTE — PROGRESS NOTES
Vitamin d is low and recommend continue with supplements and will send in a new script.  Prolactin is normal.  Liver enzymes slightly elevated, which should come down with diet and exercise.  Mesalamine can sometimes elevate AST and ALT but I think we can watch for now and will recommend repeat in 4 months.  Cholesterol is normal.  Thyroid is normal.

## 2021-06-09 NOTE — TELEPHONE ENCOUNTER
Refill Approved    Rx renewed per Medication Renewal Policy. Medication was last renewed on 11/18/19.    Milagros Chew, [facility] Triage/Med Refill 6/18/2020     Requested Prescriptions   Pending Prescriptions Disp Refills     levothyroxine (SYNTHROID, LEVOTHROID) 112 MCG tablet [Pharmacy Med Name: LEVOTHYROXINE 0.112MG (112MCG) TABS] 180 tablet 1     Sig: TAKE 2 TABLETS(224 MCG) BY MOUTH DAILY       Thyroid Hormones Protocol Passed - 6/17/2020  8:48 AM        Passed - Provider visit in past 12 months or next 3 months     Last office visit with prescriber/PCP: Visit date not found OR same dept: Visit date not found OR same specialty: Visit date not found  Last physical: 11/18/2019 Last MTM visit: Visit date not found   Next visit within 3 mo: Visit date not found  Next physical within 3 mo: Visit date not found  Prescriber OR PCP: Kimberly Montano MD  Last diagnosis associated with med order: 1. Hypothyroidism, unspecified type  - levothyroxine (SYNTHROID, LEVOTHROID) 112 MCG tablet [Pharmacy Med Name: LEVOTHYROXINE 0.112MG (112MCG) TABS]; TAKE 2 TABLETS(224 MCG) BY MOUTH DAILY  Dispense: 180 tablet; Refill: 1    2. Vitamin deficiency  - levothyroxine (SYNTHROID, LEVOTHROID) 112 MCG tablet [Pharmacy Med Name: LEVOTHYROXINE 0.112MG (112MCG) TABS]; TAKE 2 TABLETS(224 MCG) BY MOUTH DAILY  Dispense: 180 tablet; Refill: 1    If protocol passes may refill for 12 months if within 3 months of last provider visit (or a total of 15 months).             Passed - TSH on file in past 12 months for patient age 12 & older     TSH   Date Value Ref Range Status   12/30/2019 0.45 0.30 - 5.00 uIU/mL Final

## 2021-06-11 NOTE — TELEPHONE ENCOUNTER
Would like to know if she can come in 11/19/2020 at 7am and get her flu shot while she is in the office with her daughter during her appointment with Dr Ruff at 7am.

## 2021-06-12 NOTE — PROGRESS NOTES
Liver enzymes still up slightly and recommend an ultrasound of the liver to check on things.  Please my chart message me if this is ok and I will place the order.

## 2021-06-12 NOTE — PROGRESS NOTES
CC: I was asked to see Sherry Yates by Dr Montano secondary to abnormal bleeding.    HPI: The pt is a 45 y.o. MWF  who presents with abnormal bleeding since July.  July's period was heavy enough that she had to change a pad or tampon every 30-45 minutes.  Her period in Aug was more normal.  Then in Sept she started bleeding on the 28th and bled for 22 days, only stopping 2 days ago.  It was heavy at times.  She has always had irregular periods and had fertility issues as well.    Past Medical History:   Diagnosis Date     Adenoma of pituitary (H)      Inverted nipple     Bilat and normal      PSC (primary sclerosing cholangitis)      Ulcerative colitis (H)        Past Surgical History:   Procedure Laterality Date     LAPAROSCOPY FOR ECTOPIC PREGNANCY N/A        Patient's   Family History   Problem Relation Age of Onset     No Medical Problems Mother      Rheum arthritis Father      Hypertension Father      Heart disease Paternal Aunt      Bipolar disorder Maternal Grandmother      Breast cancer Neg Hx        Patient   Social History     Socioeconomic History     Marital status:      Spouse name: None     Number of children: 1     Years of education: None     Highest education level: Bachelor's degree (e.g., BA, AB, BS)   Occupational History     Occupation: Teacher   Social Needs     Financial resource strain: None     Food insecurity     Worry: None     Inability: None     Transportation needs     Medical: None     Non-medical: None   Tobacco Use     Smoking status: Never Smoker     Smokeless tobacco: Never Used     Tobacco comment: no exposure   Substance and Sexual Activity     Alcohol use: Yes     Frequency: Monthly or less     Drinks per session: 1 or 2     Binge frequency: Never     Drug use: No     Sexual activity: Yes     Partners: Male     Birth control/protection: None   Lifestyle     Physical activity     Days per week: None     Minutes per session: None     Stress: None   Relationships      Social connections     Talks on phone: None     Gets together: None     Attends Scientologist service: None     Active member of club or organization: None     Attends meetings of clubs or organizations: None     Relationship status: None     Intimate partner violence     Fear of current or ex partner: None     Emotionally abused: None     Physically abused: None     Forced sexual activity: None   Other Topics Concern     None   Social History Narrative     None       Outpatient Medications Prior to Visit   Medication Sig Dispense Refill     cholecalciferol, vitamin D3, 1,250 mcg (50,000 unit) capsule Take 50,000 Units by mouth once a week. 12 capsule 1     ketoconazole (NIZORAL) 2 % cream Apply topically 2 (two) times a day. 15 g 0     levothyroxine (SYNTHROID, LEVOTHROID) 112 MCG tablet TAKE 2 TABLETS(224 MCG) BY MOUTH DAILY 180 tablet 1     mesalamine (ASACOL HD) 800 mg TbEC EC tablet Take 1 tablet (800 mg total) by mouth 3 (three) times a day. 180 tablet 0     No facility-administered medications prior to visit.        Patient is allergic to codeine; morphine; and opioids - morphine analogues.    ROS:  12 part ROS is negative aside from those symptoms in the HPI    PE:  /88 (Patient Site: Right Arm, Patient Position: Sitting, Cuff Size: Adult Large)   Pulse 70   Wt (!) 308 lb (139.7 kg)           Body mass index is 45.48 kg/m .    General: obese WF, NAD  EG/BUS wnl  Vagina no lesions, no abnormal discharge  Cervix no lesions, tenaculum was used, os finder was used; could not pass through the cervix despite both efforts  Pt tolerated procedure well  Psych: normal mood  Neuro: CN I-XII grossly intact  MS: normal gait    Assessment: 45 y.o. MWF  with abnormal bleeding, failed EMB.    Plan: Natural history of abnormal joao-menopausal bleeding discussed with the patient.  We discussed the risks of hyperplasia as well.  We discussed that we could do an ultrasound, try the EMB again with using misoprostol  first, or schedule a hysteroscopy/D&C.  After counseling on the pros and cons of each, she decided she'd like to do the hysteroscopy so she gets a definitive diagnosis on what is happening.  That will be scheduled at [facility].  She was counseled on the need for someone to drive her home and stay with her after surgery, the need for a pre-op exam prior to the surgery, the need to have nothing to eat or drink after midnight on the day of surgery, and that the surgery center would call her a day or two before the procedure to go over details of the process and what time to arrive.    Questions were answered to the best of my ability.  Approximately 15 minutes were spent with the patient with the majority in counseling, separate from the biopsy attempt itself.

## 2021-06-12 NOTE — PATIENT INSTRUCTIONS - HE
"  Preparing for Your Surgery  Getting started  A surgery nurse will call you to review your health history and instructions. They will give you an arrival time based on your scheduled surgery time.  Please be ready to share the following:    Your doctor's clinic name and phone number    Your medical, surgical and anesthesia history    A list of allergies and sensitivities    A list of medicines, including herbal treatments and over-the-counter drugs    Whether the patient has a legal guardian (ask how to send us the papers in advance)  If your child is having surgery, please ask for a copy of Preparing for Your Child's Surgery.    Preparing for surgery    Within 30 days of surgery: Have an exam at your family clinic (primary care clinic), or go to a pre-operative clinic. This exam is called a \"History and Physical,\" or H&P.    At your H&P exam, talk to your care team about all medicines you take. If you need to stop any medicines before surgery, ask when to start taking them again.  ? We do this for your safety. Many medicines can make you bleed too much during surgery. Some change how well surgery (anesthesia) drugs work.    Call your insurance company to see what it will and won't pay for. Ask if they need to pre-approve the surgery. (If no insurance, call [phone].)    Call your surgeon's clinic if there's any change in your health. This includes signs of a cold or flu (sore throat, runny nose, cough, rash, fever). It also includes a scrape or scratch near the surgery site.    If you have questions on the day of surgery, call your surgery center.  Eating and drinking guidelines  For your safety: Unless your surgeon tells you otherwise, follow the guidelines below.    Eat and drink as usual until 8 hours before surgery. After that, no food or milk.    Drink clear liquids until 2 hours before surgery. These are liquids you can see through, like water, Gatorade and Propel Water. You may also have black coffee " and tea (no cream or milk).    Nothing by mouth within 2 hours of surgery. This includes gum, candy and breath mints.    Stop alcohol the midnight before surgery.    If your family clinic tells you to take medicine on the morning of surgery, it's okay to take it with a sip of water.  Preventing infection    Shower or bathe the night before and morning of your surgery. Follow the instructions your clinic gave you. (If no instructions, use regular soap.)    Don't shave or clip hair near your surgery site. This can lead to skin infection.    Don't smoke the morning of surgery. Smoking increases the risk of infection. You may chew nicotine gum up to 2 hours before surgery. A nicotine patch is okay.  ? Note: Some surgeries require you to completely quit smoking and nicotine. Check with your surgeon.    Your care team will make every effort to keep you safe from infection. We will:  ? Clean our hands often with soap and water (or an alcohol-based hand rub).  ? Clean the skin at your surgery site with a special soap that kills germs. We'll also remove hair from the site as needed.  ? Wear special hair covers, masks, gowns and gloves during surgery.  ? Give antibiotic medicine, if prescribed. Not all surgeries need antibiotics.  What to bring on the day of surgery    Photo ID and insurance card    Copy of your health care directive, if you have one    Glasses and hearing aides (bring cases)  ? You can't wear contacts during surgery    Inhaler and eye drops, if you use them (tell us about these when you arrive)    CPAP machine or breathing device, if you use them    A few personal items, if spending the night    If you have . . .  ? A pacemaker or ICD (cardiac defibrillator): Bring the ID card.  ? An implanted stimulator: Bring the remote control.  ? A legal guardian: Bring a copy of the certified (court-stamped) guardianship papers.  Please remove any jewelry, including body piercings. Leave jewelry and other valuables at  home.  If you're going home the day of surgery  Important: If you don't follow the rules below, we must cancel your surgery.     Arrange for someone to drive you home after surgery. You may not drive, take a taxi or take public transportation by yourself (unless you'll have local anesthesia only).    Arrange for a responsible adult to stay with you overnight. If you don't, we may keep you in the hospital overnight, and you may need to pay the costs yourself.  Questions?   If you have any questions for your care team, list them here: _________________________________________________________________________________________________________________________________________________________________________________________________________________________________________________________________________________________________________________________  For informational purposes only. Not to replace the advice of your health care provider. Copyright   3656-4495 [employer]. All rights reserved. Clinically reviewed by Dinah Smith MD. SMARTworks 691184 - REV 07/19.      Before Your Procedure or Hospital Admission  Testing for COVID-19 (Coronavirus)  Thank you for choosing [facility] for your health care needs. This is a very challenging time for everyone. The World Health Organization and the State of [place] have declared the COVID-19 virus a pandemic.   Our goal is to keep you and our team here at [facility] safe and healthy. We've taken several steps to make this happen. For example:    We screen our staff, care teams and patients for COVID-19.    Everyone at [facility] must wear a mask and stay 6 feet apart.    We are limiting hospital and clinic visitors.  Before you come in  All patients must get tested for COVID-19. Your test needs to happen 2 to 4 days before you check in to the hospital or surgery site.   A clinic scheduler will call about a week in advance to set up a testing time at  "one of our labs where we'll take a swab of your nose or throat.  Note: If you go to a clinic or pharmacy like [facility] or [facility] for your test, make sure it's a \"RT-PCR\" test, not a \"rapid\" COVID-19 test. (See Questions and Answers below.)  After the test, please stay at home and stay out of contact with other people. It will be harder for you to recover if you get COVID-19 before your treatment.  Please follow all current safety guidelines, including:    Limit trips outside your home.    Limit the number of people you see.    Always wear a mask outside your home.    Use social distancing. (Stay 6 feet away from others whenever you can.)    Wash your hands often.  If your test shows you have COVID-19  If your test is positive, we'll let you know. A positive test means that you have the virus.   We'll probably have to postpone your admission, surgery or procedure. Your doctor will discuss this with you. After that, we'll let you know what to do and when you can reschedule.   We may need to cancel your treatment on short notice for other reasons, too.  If your test shows you DON'T have COVID-19  Even if your test is negative, you may still get COVID-19. It's rare but, sometimes, the test result is wrong. You could also catch the virus after taking the test.   There's a very small chance that you could catch COVID-19 in the hospital or surgery center. [facility] has taken many steps to prevent this from happening.   Day of your surgery or procedure    Please come wearing a mask or something else that covers both your nose and mouth.    When you arrive, we'll ask you some questions to find out if you have any signs or symptoms of COVID-19.    Ask your care team if you can have visitors. All visitors must wear masks and will be screened for signs of COVID-19.  ? Even if no visitors are allowed, you can still have with you:    Your legal guardian or legal decision maker    A parent and one other visitor, if you are " "younger than 18 years old    A partner and a , if you are in labor  ? We might need to teach you about taking care of yourself after surgery. If so, a visitor can come into the hospital to learn about it, too.  ? The rules for visitors change often, depending on how much the virus is spreading. To learn more, see Visiting a Loved One in the Hospital during the COVID-19 Outbreak.  Please call your care team, hospital or surgery center if you have any questions. We thank you for your understanding and for choosing [facility] for your care.   Questions and Answers  Does it matter where I get tested for COVID-19?  Yes. We urge you to get tested at one of our [facility] COVID-19 testing sites. We process these tests in our lab and can get the results quickly. Your [facility] care team needs to get your results before you check in.  What should I do if I can't get tested at [facility]?  You can get tested somewhere else, but you'll need to take these extra steps:  1. Contact your family doctor or clinic to arrange your test.  2. Take the test within 4 days of your surgery or procedure. We can't accept tests older than 4 days.  3. Make sure your doctor or clinic faxes your results to [facility] at [phone].  If we don't get your results in time, we may have to postpone or cancel your treatment.  Ask if you're getting a \"RT-PCR\" COVID-19 test. It should NOT be a \"rapid\" COVID-19 test. Many drug stores use \"rapid\" tests, but they may not be as accurate. We don't accept the results of \"rapid\" tests.  For informational purposes only. Not to replace the advice of your health care provider. Copyright   2020 [facility] [employer]. All rights reserved. Clinically reviewed by Infection Prevention and the [facility] COVID-19 Clinical Team. SMARTworks 916012 - Rev 10/07/20.    "

## 2021-06-12 NOTE — PROGRESS NOTES
[facility]  [address], [address]  [place] [address]  Dept: [phone]  Dept Fax: [phone]  Primary Provider: Kimberly Montano MD  Pre-op Performing Provider: KIMBERLY MONTANO    PREOPERATIVE EVALUATION:  Today's date: 11/5/2020    Sherry Yates is a 45 y.o. female who presents for a preoperative evaluation.    Surgical Information:  Surgery/Procedure: hysteroscopy and D&C   Surgery Location: [facility]  Surgeon: Dr. Lange  Surgery Date: 11/11/2020  Time of Surgery: 9:30am  Where patient plans to recover: At home with family  Fax number for surgical facility: Note does not need to be faxed, will be available electronically in Epic.    Type of Anesthesia Anticipated: Local with MAC    Subjective     HPI related to upcoming procedure: menorrhagia.    Preop Questions 11/5/2020   Have you ever had a heart attack or stroke? No   Have you ever had surgery on your heart or blood vessels, such as a stent placement, a coronary artery bypass, or surgery on an artery in your head, neck, heart, or legs? No   Do you have chest pain with activity? No   Do you have a history of  heart failure? No   Do you currently have a cold, bronchitis or symptoms of other infection? No   Do you have a cough, shortness of breath, or wheezing? No    Do you or anyone in your family have previous history of blood clots? No   Do you or does anyone in your family have a serious bleeding problem such as prolonged bleeding following surgeries or cuts? No   Have you ever had problems with anemia or been told to take iron pills? YES - menorrhagia     Have you had any abnormal blood loss such as black, tarry or bloody stools, or abnormal vaginal bleeding? YES -   Have you ever had a blood transfusion? No   Are you willing to have a blood transfusion if it is medically needed before, during, or after your surgery? Yes   Have you or any of your relatives ever had problems with anesthesia? No   Do you have  sleep apnea, excessive snoring or daytime drowsiness? No   Do you have any artifical heart valves or other implanted medical devices like a pacemaker, defibrillator, or continuous glucose monitor? No   Do you have artificial joints? No   Are you allergic to latex? No   Is there any chance that you may be pregnant? No     Health Care Directive:  Patient does not have a Health Care Directive or Living Will: Discussed advance care planning with patient; however, patient declined at this time.    Preoperative Review of :    reviewed - no record of controlled substances prescribed.    See problem list for active medical problems.  Problems all longstanding and stable, except as noted/documented.  See ROS for pertinent symptoms related to these conditions.      Review of Systems  Constitutional, neuro, ENT, endocrine, pulmonary, cardiac, gastrointestinal, genitourinary, musculoskeletal, integument and psychiatric systems are negative, except as otherwise noted.      Patient Active Problem List    Diagnosis Date Noted     Menorrhagia with irregular cycle 10/22/2020     Morbid obesity (H) 08/14/2018     Ulcerative colitis (H) 10/17/2017     Pituitary Prolactinoma      Primary Sclerosing Ascending Cholangitis      Primary Hypothyroidism      Past Medical History:   Diagnosis Date     Adenoma of pituitary (H)      Inverted nipple     Bilat and normal      PSC (primary sclerosing cholangitis)      Ulcerative colitis (H)      Past Surgical History:   Procedure Laterality Date     LAPAROSCOPY FOR ECTOPIC PREGNANCY N/A      Current Outpatient Medications   Medication Sig Dispense Refill     cholecalciferol, vitamin D3, 1,250 mcg (50,000 unit) capsule Take 50,000 Units by mouth once a week. 12 capsule 1     ketoconazole (NIZORAL) 2 % cream Apply topically 2 (two) times a day. 15 g 0     levothyroxine (SYNTHROID, LEVOTHROID) 112 MCG tablet TAKE 2 TABLETS(224 MCG) BY MOUTH DAILY 180 tablet 1     mesalamine (ASACOL HD) 800 mg  "TbEC EC tablet Take 1 tablet (800 mg total) by mouth 3 (three) times a day. 180 tablet 0     No current facility-administered medications for this visit.        Allergies   Allergen Reactions     Codeine      Morphine      Opioids - Morphine Analogues Itching       Social History     Tobacco Use     Smoking status: Never Smoker     Smokeless tobacco: Never Used     Tobacco comment: no exposure   Substance Use Topics     Alcohol use: Yes     Frequency: Monthly or less     Drinks per session: 1 or 2     Binge frequency: Never        Social History     Substance and Sexual Activity   Drug Use No        Objective     BP (!) 140/94 (Patient Site: Right Arm, Patient Position: Sitting, Cuff Size: Adult Large)   Pulse 68   Temp 97.6  F (36.4  C) (Oral)   Ht 5' 8.25\" (1.734 m)   Wt (!) 301 lb 14.4 oz (136.9 kg)   LMP 11/01/2020   SpO2 96%   BMI 45.57 kg/m    Physical Exam    GENERAL APPEARANCE: healthy, alert and no distress     EYES: EOMI, PERRL     HENT: ear canals and TM's normal and nose and mouth without ulcers or lesions     NECK: no adenopathy, no asymmetry, masses, or scars and thyroid normal to palpation     RESP: lungs clear to auscultation - no rales, rhonchi or wheezes     BREAST: normal without masses, tenderness or nipple discharge and no palpable axillary masses or adenopathy     CV: regular rates and rhythm, normal S1 S2, no S3 or S4 and no murmur, click or rub     ABDOMEN:  soft, nontender, no HSM or masses and bowel sounds normal     MS: extremities normal- no gross deformities noted, no evidence of inflammation in joints, FROM in all extremities.     SKIN: no suspicious lesions or rashes     NEURO: Normal strength and tone, sensory exam grossly normal, mentation intact and speech normal     PSYCH: mentation appears normal. and affect normal/bright     LYMPHATICS: No cervical adenopathy    Recent Labs   Lab Test 11/05/20  1025 07/13/20  0950 12/30/19  0913   HGB 13.1  --  13.1     --  226   NA " 141 140  --    K 4.0 4.0  --    CREATININE 0.77 0.77  --         PRE-OP Diagnostics:   Labs pending at this time. Results will be reviewed when available.  No EKG required, no history of coronary heart disease, significant arrhythmia, peripheral arterial disease or other structural heart disease.    REVISED CARDIAC RISK INDEX (RCRI)   The patient has the following serious cardiovascular risks for perioperative complications:   - No serious cardiac risks = 0 points    RCRI INTERPRETATION: 0 points: Class I (very low risk - 0.4% complication rate)         Assessment & Plan      The proposed surgical procedure is considered LOW risk.    Preop general physical exam  Routine labs.  No absolute contraindication to surgery  - Comprehensive Metabolic Panel  - HM1(CBC and Differential)    Menorrhagia with irregular cycle  Proceed with surgery and follow up with OBGYN    UC (ulcerative colitis) (H)  Stable.  Refill medications  - mesalamine (ASACOL HD) 800 mg TbEC EC tablet  Dispense: 180 tablet; Refill: 0    Other specified hypothyroidism  Stable.  Continue with current medications    Pituitary Prolactinoma  Stable.  Reviewed labs    Ulcerative colitis without complications, unspecified location (H)  Stable.         Risks and Recommendations:  The patient has the following additional risks and recommendations for perioperative complications:   - No identified additional risk factors other than previously addressed    Medication Instructions:  Patient is to take all scheduled medications on the day of surgery    RECOMMENDATION:  APPROVAL GIVEN to proceed with proposed procedure, without further diagnostic evaluation.    Signed Electronically by: Kimberly Montano MD    Copy of this evaluation report is provided to requesting physician.    [facility] Pre[facility] Guidelines    Revised Cardiac Risk Index

## 2021-06-13 NOTE — TELEPHONE ENCOUNTER
Please reach out to patient and make sure she has an appointment to follow up on bp.  If she has a cuff at home have her check daily and then we can do a video visit.  If she does not then plan for F2F visit in the next week

## 2021-06-13 NOTE — ANESTHESIA POSTPROCEDURE EVALUATION
Patient: Sherry Yates  Procedure(s):  HYSTEROSCOPY, WITH DILATION AND CURETTAGE OF UTERUS  Anesthesia type: MAC    Patient location: Phase II Recovery  Last vitals:   Vitals Value Taken Time   /87 11/11/20 1046   Temp 36  C (96.8  F) 11/11/20 1015   Pulse 70 11/11/20 1056   Resp 16 11/11/20 1015   SpO2 96 % 11/11/20 1056   Vitals shown include unvalidated device data.  Post vital signs: stable  Level of consciousness: awake and responds to simple questions  Post-anesthesia pain: pain controlled  Post-anesthesia nausea and vomiting: no  Pulmonary: unassisted, return to baseline  Cardiovascular: stable and blood pressure at baseline  Hydration: adequate  Anesthetic events: no    QCDR Measures:  ASA# 11 - [place]-op Cardiac Arrest: ASA11B - Patient did NOT experience unanticipated cardiac arrest  ASA# 12 - [place]-op Mortality Rate: ASA12B - Patient did NOT die  ASA# 13 - PACU Re-Intubation Rate: NA - No ETT / LMA used for case  ASA# 10 - Composite Anes Safety: ASA10A - No serious adverse event    Additional Notes:

## 2021-06-13 NOTE — TELEPHONE ENCOUNTER
----- Message from Mkiki Lange MD sent at 12/3/2020  4:59 PM CST -----  Kimberly -  Just wanted to send a quick note.  Her blood pressure was really high at her post-op today - first 161/107, repeat after 15 minutes 176/100.  When I first saw her it was 132/88.  In the OR it was in the 150s/80s.  I counseled her to follow up with you in the next week or so and to get a cuff so she can check at home.  Mikki

## 2021-06-13 NOTE — PROGRESS NOTES
Assessment:     1. Pituitary Prolactinoma  We will check prolactin and she will likely need to go back on bromocriptine, but she needs to see endocrine to sort this out and whether or not she needs an MRI as well.  - Ambulatory referral to Endocrinology    2. Primary Sclerosing Ascending Cholangitis  She has not been treating this and will check lab work and follow-up with GI to decide if she should restart the ursodiol  - Comprehensive Metabolic Panel  - HM2(CBC w/o Differential)  - Erythrocyte Sedimentation Rate    3. Hypothyroidism  We will check a TSH.  She has missed the past week or 2.  She will likely need repeat in a month treatment.  With her multiple medical problems, I think it certainly is reasonable to consider a MEN syndrome?      - Thyroid Stimulating Hormone (TSH)    4. Screening cholesterol level    - Lipid Cascade    5. Screening for metabolic disorder    - Urinalysis    6. Ulcerative Colitis -also followed by [facility].  She has been off medicine for some time.  She is redeveloped some softer loose stools.  She was previously on         Plan:      Patient Instructions   Due for a colonoscopy -     See Endocrine - [phone] soon for the Pituitary adenoma follow up and decide on need for MRI and treatment.     See GI for PSC- and UC Dr Caba         Subjective:      Sherry Yates is a 42 y.o. female who presents for an annual exam.              Immunization History   Administered Date(s) Administered     DT (pediatric) 10/21/2005     Influenza, Seasonal, Inj PF 10/28/2013     Influenza, inj, historic 10/25/2012     Influenza, seasonal,quad inj 36+ mos 10/29/2015, 11/01/2016     Influenza, seasonal,quad inj 6-35 mos 11/13/2014     Td, historic 10/21/2005     Tdap 08/17/2009         Current Outpatient Prescriptions   Medication Sig Dispense Refill     levothyroxine (SYNTHROID, LEVOTHROID) 200 MCG tablet TAKE 1 TABLET BY MOUTH EVERY DAY 90 tablet 1     levothyroxine (SYNTHROID, LEVOTHROID)  "25 MCG tablet TAKE 1 TABLET BY MOUTH EVERY DAY 90 tablet 1     No current facility-administered medications for this visit.      No past medical history on file.  No past surgical history on file.  Codeine and Opioids - morphine analogues  Family History   Problem Relation Age of Onset     No Medical Problems Mother      Rheum arthritis Father      Social History     Social History     Marital status:      Spouse name: N/A     Number of children: N/A     Years of education: N/A     Occupational History     Not on file.     Social History Main Topics     Smoking status: Never Smoker     Smokeless tobacco: Never Used      Comment: no exposure     Alcohol use No     Drug use: No     Sexual activity: Not on file     Other Topics Concern     Not on file     Social History Narrative       Review of Systems- also see form B and A           Objective:     Vitals:    10/17/17 1302   BP: 138/86   Pulse: 64   Weight: (!) 257 lb 9.6 oz (116.8 kg)   Height: 5' 7.72\" (1.72 m)       Physical  General Appearance: Alert, cooperative, no distress, appears stated age  Head: Normocephalic, without obvious abnormality, atraumatic  Eyes: PERRL, conjunctiva/corneas clear, EOM's intact  Ears: Normal TM's and external ear canals bilateral  Nose: No abnormalities noted  Throat: Lips, mucosa, and tongue normal  Neck: Supple, symmetrical, trachea midline, no adenopathy or thyromegally,  no tenderness/mass/nodules; no carotid bruit or JVD  Back: no CVA tenderness or spinous process pain  Lungs: Clear to auscultation bilaterally, good air movent  Heart: Regular rate and rhythm, S1 and S2 normal, no murmur, rub, or gallop,  Abdomen: Soft, non-tender, no masses, no organomegaly, morbidly obese  Musculoskeletal: No gross abnormalities    Extremities: Extremities normal, atraumatic, no cyanosis or edema, pulses 2/4 and symmetric  Skin: Skin color, texture, turgor normal, no rashes or worrisome lesions no evidence of jaundice  Lymph nodes: " Cervical, supraclavicular, groin, and axillary nodes normal  Neurologic: CN2-12 intact, normal sensation, DTRs 2/4 and symmetric.   Psychiatric:  normal mood and affect.

## 2021-06-13 NOTE — TELEPHONE ENCOUNTER
Patient will purchase a bp machine this weekend and start recording her blood pressures. VV set up on 12/17/20202 to f/jennifer.  Negin Veloz CMA ............... 3:07 PM, 12/04/20

## 2021-06-13 NOTE — TELEPHONE ENCOUNTER
Patient Returning Call  Reason for call:  Patient retuning call   Information relayed to patient:    Citlali Ma RSdominique  3:10 PM                Left message to reschedule 2 visits.  On 12/17 Dr. Montano can do phone visit at 7:30 instead of 7:00 if that works for pt.       On 1/18/21 provider schedule has changed, please reschedule as patient is able.                    Patient has additional questions:  No  If YES, what are your questions/concerns:  None   Okay to leave a detailed message?: No

## 2021-06-13 NOTE — ANESTHESIA PREPROCEDURE EVALUATION
Anesthesia Evaluation      Patient summary reviewed   No history of anesthetic complications     Airway   Mallampati: III  Neck ROM: full   Pulmonary - negative ROS    breath sounds clear to auscultation                         Cardiovascular - negative ROS  Exercise tolerance: > or = 4 METS  Rhythm: regular  Rate: normal,         Neuro/Psych - negative ROS     Endo/Other    (+) hypothyroidism, obesity,      Comments: Prolactinoma  Menorrhagia       GI/Hepatic/Renal      Comments: Primary Sclerosing Cholangitis  Elevated LFTs  Ulcerative Colitis           Dental - normal exam                        Anesthesia Plan  Planned anesthetic: MAC  Please avoid all opioids per patient request   ASA 3     Anesthetic plan and risks discussed with: patient  Anesthesia plan special considerations: antiemetics,   Post-op plan: routine recovery

## 2021-06-13 NOTE — ANESTHESIA CARE TRANSFER NOTE
Last vitals:   Vitals:    11/11/20 1015   BP: (P) 156/90   Pulse: (P) 75   Resp: (P) 16   Temp: (P) 36  C (96.8  F)   SpO2: (P) 95%     Pt brought to phase 2 on room air. Monitors applied. VSS.    Patient's level of consciousness is drowsy  Spontaneous respirations: yes  Maintains airway independently: yes  Dentition unchanged: yes  Oropharynx: oropharynx clear of all foreign objects    QCDR Measures:  ASA# 20 - Surgical Safety Checklist: WHO surgical safety checklist completed prior to induction    PQRS# 430 - Adult PONV Prevention: 4558F - Pt received => 2 anti-emetic agents (different classes) preop & intraop  ASA# 8 - Peds PONV Prevention: NA - Not pediatric patient, not GA or 2 or more risk factors NOT present  PQRS# 424 - [place]-op Temp Management: 4559F - At least one body temp DOCUMENTED => 35.5C or 95.9F within required timeframe  PQRS# 426 - PACU Transfer Protocol: - Transfer of care checklist used  ASA# 14 - Acute Post-op Pain: ASA14B - Patient did NOT experience pain >= 7 out of 10

## 2021-06-13 NOTE — TELEPHONE ENCOUNTER
Left message to reschedule 2 visits.  On 12/17 Dr. Montano can do phone visit at 7:30 instead of 7:00 if that works for pt.      On 1/18/21 provider schedule has changed, please reschedule as patient is able.

## 2021-06-13 NOTE — PROGRESS NOTES
S:  Pt is here for a post-op visit following hysteroscopy.  Her surgery was on 11/11/2020.  She has been feeling well overall.  Her complaints are none.  Her bleeding lasted a couple days.  She did then have about 7 days of spotting from around 11/14-11/20.  She isn't having any pain.      O:  BP (!) 176/100 (Patient Site: Right Arm, Patient Position: Sitting, Cuff Size: Adult Large)   Pulse 67   Wt (!) 305 lb (138.3 kg)   LMP 11/11/2020 (Exact Date)  Body mass index is 46.04 kg/m . First /107    A:  Normal post-op check aside from her blood pressure.    P:  Surgical findings discussed with patient. We discussed that the abnormal bleeding could be polyps, hormones, or both.  I counseled her to let me know what happens with her next period to determine if we need to use some cyclic progestin.  Questions answered.  Activity and restrictions discussed with pt.  I recommended that she make a follow up appointment with Dr Montano in the next week or so for further evaluation of her blood pressure.  We also discussed getting a cuff for home checking.  Follow up with me prn.

## 2021-06-13 NOTE — TELEPHONE ENCOUNTER
Left voicemail for patient to return call to clinic. When patient returns call, please give them below message.    Please give message below and help patient schedule.    Negin Veloz CMA ............... 10:19 AM, 12/04/20

## 2021-06-14 NOTE — PROGRESS NOTES
"Judith Yates,    Your result for potassium is normal.  Your liver function is up slightly, a bit more than it has been in the past.  Recommend watching diet and exercise as it is most likely related to \"fatty liver\" and then recheck labs in 6 months.  Kimberly Montano MD"

## 2021-06-14 NOTE — PROGRESS NOTES
"  Assessment & Plan     Chronic cough  Recommend trial of zyrtec and humidifier in the room.  Offered referral to ENT/allergy and pt would like to wait at this time.  No signs of reflux - not feel like this is worse at night and so will trial antihistamines first.  - cetirizine (ZYRTEC) 10 MG tablet  Dispense: 30 tablet; Refill: 2    Morbid obesity (H)  Recommend diet and exercise    Anxiety  Pt feels like this is stable for now and would like to continue to monitor.      Review of external notes as documented above     15 minutes spent on the date of the encounter doing chart review, history and exam, documentation and further activities as noted above    =     BMI:   Estimated body mass index is 45.89 kg/m  as calculated from the following:    Height as of 11/5/20: 5' 8.25\" (1.734 m).    Weight as of this encounter: 304 lb (137.9 kg).         Return in about 6 months (around 7/18/2021) for Recheck.    Kimberly Montano MD  [facility]    Adalberto Yates is 45 y.o. and presents to clinic today for the following health issues   HPI   Patient reports feeling \"stressed\" as the job is not getting better soon.  \"I cannot keep up with the demands at work\".  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  The patient also reports having 3 people in a small house which is causing stress.  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  Patient indicates she is still no sleeping at night (2-2.5 hours of uninterrupted sleep).  On average patient states she receives 4-4.5 hours of sleep per night.  Patient denies any depression and feels as though her mood is \"good\".  Patient indicates \"I have a really good support system\".  \"I have not been able to exercise though.\"  Patient reports a dry cough since October that is intermittent throughout the day and has \"lingered longer than usual\".  Patient denies fever, chills, rhinorrhea or headache.        Review of Systems  A " review of systems was obtained and is negative other than what is stated in the HPI.         Objective    /90 (Patient Site: Right Arm, Patient Position: Sitting, Cuff Size: Adult Large)   Pulse 73   Wt (!) 304 lb (137.9 kg)   LMP 01/06/2021   SpO2 98%   BMI 45.89 kg/m    Body mass index is 45.89 kg/m .  Physical Exam   Constitutional: She is oriented to person, place, and time. No distress.   Cardiovascular: Normal rate, regular rhythm and normal heart sounds. Exam reveals no gallop and no friction rub.   No murmur heard.  Pulmonary/Chest: Effort normal and breath sounds normal. No respiratory distress. She has no wheezes. She has no rales. She exhibits no tenderness.   Neurological: She is alert and oriented to person, place, and time.   Skin: Skin is warm and dry. No rash noted. She is not diaphoretic. No erythema. No pallor.   Psychiatric: She has a normal mood and affect. Her behavior is normal. Judgment and thought content normal.

## 2021-06-16 NOTE — TELEPHONE ENCOUNTER
Telephone Encounter by Nieves Mart CMA at 4/9/2019  3:10 PM     Author: Nieves Mart CMA Service: -- Author Type: Certified Medical Assistant    Filed: 4/9/2019  3:11 PM Encounter Date: 4/9/2019 Status: Signed    : Nieves Mart CMA (Certified Medical Assistant)       Kimberly Montano MD           9:33 AM   Note      Pt needs labs prior to refill                Please place orders

## 2021-06-17 NOTE — TELEPHONE ENCOUNTER
Telephone Encounter by Lilo Sifuentes LPN at 12/4/2020 11:52 AM     Author: Lilo Sifuentes LPN Service: -- Author Type: Licensed Nurse    Filed: 12/4/2020 11:58 AM Encounter Date: 12/4/2020 Status: Signed    : Lilo Sifuentes LPN (Licensed Nurse)       Patient Returning Call  Reason for call:  Patient returning call  Information relayed to patient:  Negin Veloz CMA         12/4/20 10:19 AM  Note     Left voicemail for patient to return call to clinic. When patient returns call, please give them below message.     Please give message below and help patient schedule.     eNgin Veloz CMA ............... 10:19 AM, 12/04/20 12/4/20 9:32 AM  Kimberly Montano MD routed this conversation to Kimberly Montano Care Team Pool   Kimberly Montano MD         12/4/20 9:32 AM  Note     Please reach out to patient and make sure she has an appointment to follow up on bp.  If she has a cuff at home have her check daily and then we can do a video visit.  If she does not then plan for F2F visit in the next week      Kimberly Montano MD         12/4/20 9:32 AM  Note     ----- Message from Mikki Lange MD sent at 12/3/2020  4:59 PM CST -----  Kimberly -  Just wanted to send a quick note.  Her blood pressure was really high at her post-op today - first 161/107, repeat after 15 minutes 176/100.  When I first saw her it was 132/88.  In the OR it was in the 150s/80s.  I counseled her to follow up with you in the next week or so and to get a cuff so she can check at home.  Mikki            Additional Documentation    Vitals:   LMP 11/11/2020 (Exact Date)   Encounter Info:   Billing Info,  History,  Allergies,  Detailed Report     Orders Placed    None  Medication Renewals and Changes      None     Medication List   Visit Diagnoses      None     Problem List   Patient has additional questions:  Yes  If YES, what are your questions/concerns:  Patient states she have some concerns to schedule an  appointment for BP requested a call from providers nurse to discuss directly . Patient refused to give further information  To writer .   Okay to leave a detailed message?: No

## 2021-06-17 NOTE — PATIENT INSTRUCTIONS - HE
Patient Instructions by Kimberly Montano MD at 11/18/2019  7:30 AM     Author: Kimberly Montano MD Service: -- Author Type: Physician    Filed: 11/18/2019  7:49 AM Encounter Date: 11/18/2019 Status: Addendum    : Kimberly Montano MD (Physician)    Related Notes: Original Note by Kimberly Montano MD (Physician) filed at 11/18/2019  7:35 AM           Preventing Skin Cancer     Use sunscreen of SPF 30 or greater. Apply liberally.   Relaxing in the sun may feel good. But it isnt good for your skin. In fact, the suns harmful rays are the major cause of skin cancer. This is a serious disease that can be life-threatening. People of all ages, races, and backgrounds are at risk.  Skin cancer is the most common cancer in the [place] But in most cases, it can be prevented.  Your role in prevention  You can act today to help prevent skin cancer. Start by avoiding the suns UV (ultraviolet) rays. And dont use tanning beds or lamps. They are no safer than the sun. Taking these steps can help keep you from getting skin cancer. It can also help prevent wrinkles and other aging effects caused by the sun. Make sure your children also follow these safeguards. Now is the time to start taking steps to prevent skin cancer.  When you are outdoors  Protect your skin when you go out during the day. Take safety steps whenever you go out to eat, run errands by car or on foot, or do any outdoor activity. There isnt just one easy way to protect your skin. Its best to follow all of these steps:    Wear tightly woven clothing that covers your skin. Put on a wide-brimmed hat to protect your face, ears, and scalp.    Watch the clock. Try to stay out of the sun between 10 a.m. and 4 p.m. That's when the sun's rays are strongest.    Head for the shade or create your own. Use an umbrella when sitting or strolling.    Know that the suns rays can reflect off sand, water, and snow. This can harm your skin. Take extra care when you are near reflective  "surfaces.    Keep in mind that even when the weather is hazy or cloudy, your skin can be exposed to strong UV rays.    Shield your skin with sunscreen. Also use sunscreen on your childrens skin. Keep babies younger than 6 months old out of the sun.  Tips for using sunscreen  To help prevent skin cancer, choose the right sunscreen and use it correctly. Try these tips:    Choose a sunscreen that has an SPF (sun protection factor) of at least 30. Also choose a sunscreen labeled \"broad spectrum. This will protect you from both UVA and UVB (ultraviolet A and B) rays.    If one brand irritates your skin, try another, such as one without fragrance.    Use a water-resistant sunscreen if you swim or sweat.    Use at least 1 ounce of sunscreen to cover exposed areas. This is enough to fill a shot glass. You might need to adjust the amount depending on your body size.    Put the sunscreen on dry skin about 15 minutes before going outdoors. This gives it time to soak in.    Reapply sunscreen every 2 hours. If youre active, do this more often.    Cover any sun-exposed skin, from your face to your feet. Dont forget your scalp, ears, and lips.    Know that while sunscreen helps protect you, it isnt enough. Sunscreens extend the length of time you can be outdoors before your skin starts to get red. But they don't give you total protection. Using sunscreen doesn't mean you can stay out in the sun for an unlimited time. Your skin cells are still being damaged. You should also wear protective clothing. And try to stay out of the sun as much as you can, especially from 10 a.m. to 4 p.m.  Date Last Reviewed: 7/1/2019 2000-2019 [employer]. [address] [place] [address], [place] [address]. All rights reserved. This information is not intended as a substitute for professional medical care. Always follow your healthcare professional's instructions.      Patient Education     Prevention Guidelines, Women Ages 40 to 49  Screening " tests and vaccines are an important part of managing your health. A screening test is done to find diseases in people who don't have any symptoms. The goal is to find a disease early so lifestyle changes and checkups can reduce the risk of disease. Or the goal may be to detect it early to treat it most effectively. Screening tests are not used to diagnose a disease. But they are used to see if more testing is needed. Health counseling is important, too. Below are guidelines for these, for women ages 40 to 49. Talk with your healthcare provider to make sure youre up-to-date on what you need.  Screening Who needs it How often   Type 2 diabetes or prediabetes All women beginning at age 45 and women without symptoms at any age who are overweight or obese and have 1 or more additional risk factors for diabetes At least every 3 years1   Type 2 diabetes or prediabetes All women diagnosed with gestational diabetes Lifelong testing every 3 years   Type 2 diabetes All women with prediabetes Every year   Alcohol misuse All women in this age group At routine exams   Blood pressure All women in this age group Yearly checkup if your blood pressure is normal  Normal blood pressure is less than 120/80 mm Hg  If your blood pressure reading is higher than normal, follow the advice of your healthcare provider   Breast cancer All women at average risk in this age group Screening with a mammogram can start at age 40.2 Talk with your healthcare provider to help you decide when to start screening. At age 45 start yearly mammograms.3    Cervical cancer All women in this age group, except women who have had a complete hysterectomy Pap test every 3 years or Pap test plus human papilloma virus (HPV) test every 5 years   Colorectal cancer Women age 45 years and older at average risk  Multiple tests are available and are used at different times. Possible tests include:    Flexible sigmoidoscopy every 5 years, or    Colonoscopy every 10 years,  or    CT colonography (virtual colonoscopy) every 5 years, or    Yearly fecal occult blood test, or    Yearly fecal immunochemical test every year, or    Stool DNA test, every 3 years  If you choose a test other than a colonoscopy and have an abnormal test result, you will need to follow-up with a colonoscopy. Screening advice varies among expert groups. Talk with your healthcare provider about which tests are best for you.  Some people should be screened using a different schedule because of their personal or family health history. Talk with your healthcare provider about your health history.   Chlamydia Women at increased risk for infection At routine exams if you're at risk or have symptoms   Depression All women in this age group At routine exams   Gonorrhea Sexually active women at increased risk for infection At routine exams   Hepatitis C Anyone at increased risk; 1 time for those born between 1945 and 1965 At routine exams   High cholesterol or triglycerides All women ages 45 and older who are at risk for coronary artery disease; younger women, talk with your healthcare provider At least every 5 years   HIV All women At routine exams. Those with risk factors for HIV should be tested at least annually.   Obesity All women in this age group At routine exams   Syphilis Women at increased risk for infection-talk with your healthcare provider At routine exams   Tuberculosis Women at increased risk for infection-talk with your healthcare provider Ask your healthcare provider   Vision All women in this age group Complete exam at age 40 and eye exams every 2 to 4 years. If you have a chronic disease, ask your healthcare provider how often you should have your eyes examined.4   Vaccine Who needs it How often   Chickenpox (varicella) All women in this age group who have no record of this infection or vaccine 2 doses; the second dose should be given at least 4 weeks after the first dose   Hepatitis A Women at increased  risk for infection-talk with your healthcare provider 2 doses given 6 months apart   Hepatitis B Women at increased risk for infection-talk with your healthcare provider 3 doses over 6 months; second dose should be given 1 month after the first dose; the third dose should be given at least 2 months after the second dose and at least 4 months after the first dose   Haemophilus influenzae Type B (HIB) Women at increased risk 1 to 3 doses   Influenza (flu) All women in this age group Once a year   Measles, mumps, rubella (MMR) All women in this age group who have no record of these infections or vaccines 1 or 2 doses   Meningococcal Women at increased risk for infection-talk with your healthcare provider 1 or more doses   Pneumococcal conjugate vaccine (PCV13) and pneumococcal polysaccharide vaccine (PPSV23) Women at increased risk for infection-talk with your healthcare provider 1 or 2 doses   Tetanus/diphtheria/pertussis (Td/Tdap) booster All women in this age group A 1-time dose of Tdap instead of a Td booster after age 18, then Td every 10 years   Counseling Who needs it How often   BRCA gene mutation testing for breast and ovarian cancer susceptibility Women with increased risk for having gene mutation When your risk is known   Breast cancer and chemoprevention Women at high risk for breast cancer When your risk is known   Diet and exercise Women who are overweight or obese When diagnosed, and then at routine exams   Domestic violence Women at the age in which they are able to have children At routine exams   Sexually transmitted infection prevention Women at increased risk for infection-talk with your healthcare provider At routine exams   Use of tobacco and the health effects it can cause All women in this age group Every exam   1 American Diabetes Association  2 American College of Obstetricians and Gynecologists   3 American Cancer Society  4 American Academy of Ophthalmology  Date Last Reviewed: 11/1/2017     7696-7347 [employer]. [address] [place] [address], [place] [address]. All rights reserved. This information is not intended as a substitute for professional medical care. Always follow your healthcare professional's instructions.

## 2021-06-17 NOTE — TELEPHONE ENCOUNTER
Refill Approved    Rx renewed per Medication Renewal Policy. Medication was last renewed on 11/5/20.    Matt [place], [facility] Triage/Med Refill 5/19/2021     Requested Prescriptions   Pending Prescriptions Disp Refills     mesalamine (ASACOL HD) 800 mg TbEC EC tablet 180 tablet 0     Sig: Take 1 tablet (800 mg total) by mouth 3 (three) times a day.       Mesalamine Refill Protocol for Crohns Passed - 5/18/2021  2:05 PM        Passed - LFT or AST or ALT in last year     Albumin   Date Value Ref Range Status   01/18/2021 3.9 3.5 - 5.0 g/dL Final     Bilirubin, Total   Date Value Ref Range Status   01/18/2021 1.1 (H) 0.0 - 1.0 mg/dL Final     Bilirubin, Direct   Date Value Ref Range Status   11/26/2014 0.3 <=0.5 mg/dL Final     Alkaline Phosphatase   Date Value Ref Range Status   01/18/2021 179 (H) 45 - 120 U/L Final     AST   Date Value Ref Range Status   01/18/2021 58 (H) 0 - 40 U/L Final     ALT   Date Value Ref Range Status   01/18/2021 63 (H) 0 - 45 U/L Final     Protein, Total   Date Value Ref Range Status   01/18/2021 7.8 6.0 - 8.0 g/dL Final                Passed - Visit with PCP or prescribing provider visit in last 6 months or next 3 months     Last office visit with prescriber/PCP: 1/18/2021 OR same dept: 1/18/2021 Kimberly Montano MD OR same specialty: 1/18/2021 Kimberly Montano MD Last physical: Visit date not found Last MTM visit: Visit date not found     Next appt within 3 mo: Visit date not found  Next physical within 3 mo: Visit date not found  Prescriber OR PCP: Kimberly Montano MD  Last diagnosis associated with med order: 1. UC (ulcerative colitis) (H)  - mesalamine (ASACOL HD) 800 mg TbEC EC tablet; Take 1 tablet (800 mg total) by mouth 3 (three) times a day.  Dispense: 180 tablet; Refill: 0    If protocol passes may refill for 6 months if within 3 months of last provider visit (or a total of 9 months).              Passed - CBC (Hemogram 2) in last year      WBC   Date Value Ref Range  Status   11/05/2020 10.9 4.0 - 11.0 thou/uL Final     RBC   Date Value Ref Range Status   11/05/2020 4.55 3.80 - 5.40 mill/uL Final     Hemoglobin   Date Value Ref Range Status   11/05/2020 13.1 12.0 - 16.0 g/dL Final     Hematocrit   Date Value Ref Range Status   11/05/2020 41.4 35.0 - 47.0 % Final     MCV   Date Value Ref Range Status   11/05/2020 91 80 - 100 fL Final     MCH   Date Value Ref Range Status   11/05/2020 28.8 27.0 - 34.0 pg Final     MCHC   Date Value Ref Range Status   11/05/2020 31.6 (L) 32.0 - 36.0 g/dL Final     RDW   Date Value Ref Range Status   11/05/2020 15.0 (H) 11.0 - 14.5 % Final     Platelets   Date Value Ref Range Status   11/05/2020 250 140 - 440 thou/uL Final     MPV   Date Value Ref Range Status   11/05/2020 11.5 8.5 - 12.5 fL Final                Passed - Serum Creatinine in the last year     Creatinine   Date Value Ref Range Status   01/18/2021 0.74 0.60 - 1.10 mg/dL Final

## 2021-06-18 NOTE — PROGRESS NOTES
Progress Note    Reason for Visit:  Chief Complaint     Thyroid Problem; Pituitary Problem          Progress Note:    HPI:         This patient seen saltation at the request of the primary care physician because of multiple medical problems    Primary hypo-thyroidism.    Primary sclerosing ascending cholangitis.    Pituitary tumor.    ERCP induced pancreatitis.    Thank you for referring this pleasant 43-year-old female patient who was diagnosed with hypothyroidism since her teens.  She is currently on Synthroid 225 mcg daily.    The patient has reasonable energy level.    She has a strong family history of hypothyroidism her mother and her sister.    The patient also in her teens developed galactorrhea and her prolactin was high and she has been on bromocriptine until the age of 32.    She has stopped that after her pregnancy.  She has been asymptomatic since.    Her cycles are regular.    Patient is also known to have ulcerative colitis and she takes Asacol.  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  Her cycles every 31-35 days    Component      Latest Ref Rng & Units 7/15/2016 12/21/2016 10/17/2017   Sodium      136 - 145 mmol/L 140  138   Potassium      3.5 - 5.0 mmol/L 4.3  4.2   Chloride      98 - 107 mmol/L 108 (H)  104   CO2      22 - 31 mmol/L 24  26   Anion Gap, Calculation      5 - 18 mmol/L 8  8   Glucose      70 - 125 mg/dL 88  78   BUN      8 - 22 mg/dL 13  20   Creatinine      0.60 - 1.10 mg/dL 0.67  0.92   GFR MDRD Af Amer      >60 mL/min/1.73m2 >60  >60   GFR MDRD Non Af Amer      >60 mL/min/1.73m2 >60  >60   Bilirubin, Total      0.0 - 1.0 mg/dL   0.9   Calcium      8.5 - 10.5 mg/dL 9.0  9.4   Protein, Total      6.0 - 8.0 g/dL   7.7   ALBUMIN      3.5 - 5.0 g/dL   3.6   Alkaline Phosphatase      45 - 120 U/L   135 (H)   AST      0 - 40 U/L   27   ALT      0 - 45 U/L   23   Cholesterol      <=199 mg/dL 206 (H)  255 (H)   Triglycerides      <=149  mg/dL 173 (H)  168 (H)   HDL Cholesterol      >=50 mg/dL 50  69   LDL Calculated      <=129 mg/dL 121  152 (H)   Patient Fasting > 8hrs?       Yes  No   Free T4      0.7 - 1.8 ng/dL  0.7    TSH      0.30 - 5.00 uIU/mL 3.34 11.90 (H) 68.97 (H)   Vitamin D, Total (25-Hydroxy)      30.0 - 80.0 ng/mL 22.0 (L)     Prolactin      0.0 - 20.0 ng/mL 4.4 17.2 15.5   Sed Rate      0 - 20 mm/hr   14         Review of Systems:    Nervous System: No headache, dizziness, fainting or memory loss. No tingling sensation of hand or feet.  Ears: No hearing loss or ringing in the ears  Eyes: No blurring of vision, redness, itching or dryness.  Nose: No nosebleed or loss of smell  Mouth: No mouth sores or loss of taste  Throat: No hoarseness or difficulty swallowing  Neck: No enlarged thyroid or lymph nodes.  Heart: No chest pain, palpitation or irregular heartbeat. No swelling of hands or feet  Lungs: No shortness of breath, cough, night sweats, wheezing or hemoptysis.  Gastrointestinal: No nausea or vomiting, constipation or diarrhea.  No acid reflux, abdominal pain or blood in stools.  Kidney/Bladdr: No polyuria, polydipsia, nocturia or hematuria.  Genital/Sexual: No loss of libido  Skin: No rash, hair loss or hirsutism.  No abnormal striae  Muscles/Joints/Bones: No morning stiffness, muscle aches and pain or loss of height.    Current Medications:  Current Outpatient Prescriptions   Medication Sig     levothyroxine (SYNTHROID, LEVOTHROID) 200 MCG tablet TAKE 1 TABLET BY MOUTH EVERY DAY     levothyroxine (SYNTHROID, LEVOTHROID) 25 MCG tablet TAKE 1 TABLET BY MOUTH EVERY DAY     mesalamine (ASACOL HD) 800 mg TbEC EC tablet Take 1 tablet (800 mg total) by mouth 3 (three) times a day.       Patients Active Problems:  Patient Active Problem List   Diagnosis     Pituitary Prolactinoma     Primary Sclerosing Ascending Cholangitis     Primary Hypothyroidism     Ulcerative colitis       History:   reports that she has never smoked. She has  "never used smokeless tobacco. She reports that she does not drink alcohol or use illicit drugs.   reports that she has never smoked. She has never used smokeless tobacco. She reports that she does not drink alcohol or use illicit drugs.  History   Smoking Status     Never Smoker   Smokeless Tobacco     Never Used     Comment: no exposure      reports that she has never smoked. She has never used smokeless tobacco. She reports that she does not drink alcohol or use illicit drugs.  History   Sexual Activity     Sexual activity: Not on file     No past medical history on file.  Family History   Problem Relation Age of Onset     No Medical Problems Mother      Rheum arthritis Father      No past medical history on file.  No past surgical history on file.    Vitals   height is 5' 7.72\" (1.72 m) and weight is 276 lb (125.2 kg) (abnormal). Her blood pressure is 126/88.         Exam  General appearance: The patient looked well, not in acute distress.  Eyes: no evidence of thyroid eye disease.   Retinal exam: No evidence of diabetic retinopathy.  Mouth and Throat: Normal  Neck: No evidence of thyromegaly, enlarged lymph node or tenderness  Chest: Trachea is central. Chest is clear to auscultation and percussion. Breat sounds are normal.  Cardiovascular exam: JVP is not raised. Heart sounds are normal, no murmurs or rub  Peripheral pulses are palpable.   Abdomen: No masses or tenderness.    Back: No vertebral tenderness or kyphosis.  Extremities: No evidence of leg edema.   Skin: Normal to touch.  No abnormal striae  Neurologic exam:  Visual fields are intact by confrontation, grossly intact. No evidence of peripheral neuropathy.  Detailed foot exam normal.        Diagnosis:  No diagnosis found.    Orders:   No orders of the defined types were placed in this encounter.        Assessment and Plan: Primary hypothyroidism.  She has been started on Synthroid since her teens.  Her TSH has been fluctuating between 11.9-68.9.    The " last TSH was back in October of last year she said that she has not been off her medication for a couple of weeks when she had this test.    Right now she has been taking her thyroid medication regularly I reviewed with her the proper way to take the Synthroid.    We will check her thyroid function test today and will adjust the dose as required she has reasonable energy level most likely will continue on that same dose I also discussed with her the possibility of taking [place] Thyroid that I am not keen on that for now.    We will do thyroid ultrasound to make sure that there is no nodules will check antiperoxidase antibodies.    Pituitary prolactinoma.    She has been off prolactin since age of 32 and her prolactin is 15.5 I will check lipids today she is otherwise asymptomatic she has no galactorrhea and if the prolactin is normal then will stay off the bromocriptine.  If the prolactin is high we will put her back on Dostinex and do an MRI.    Ulcerative colitis on Asacol    Vitamin D deficiency she tends to run low vitamin D 22.0 I did advise her to take vitamin D 2000 units daily and will check vitamin D level.    Primary sclerosing ascending cholangitis who follows up with gastroenterology and alkaline phosphatase is 135 creatinine 0.92 AST 27 ALT 23.    ERCP induced pancreatitis the patient was having ERCP for her ascending cholangitis but she developed pancreatitis 3 times and she stopped having that    Overweight her weight is 276 I will check her A1c if it is high we will put her on metformin her BMI is 42 I did advise her or I did offer to put her on phentermine but the patient declined that she will continue to lose weight by diet and exercise if the A1c is high then will put the patient on metformin.  Otherwise she will return to clinic in 6 month.    I have reviewed and ordered clinical lab test    I have reviewed and ordered radiology tests.    I have reviewed and ordered her medication as  required.    I have reviewed her test results and advised with the performing physician.    I have reviewed the patient's old records.    I have reviewed and summarized the patient old records.    I did spend 60 minutes with the patient more than 50% was spent on counseling and managing her care.

## 2021-06-19 NOTE — LETTER
Letter by Kimberly Montano MD at      Author: Kimberly Montano MD Service: -- Author Type: --    Filed:  Encounter Date: 4/10/2019 Status: (Other)         Sherry Yates  [address] [facility] [address]       April 10, 2019       Dear Ms. Yates,    Below are the results from your recent visit:    Resulted Orders   Comprehensive Metabolic Panel   Result Value Ref Range    Sodium 141 136 - 145 mmol/L    Potassium 4.2 3.5 - 5.0 mmol/L    Chloride 104 98 - 107 mmol/L    CO2 25 22 - 31 mmol/L    Anion Gap, Calculation 12 5 - 18 mmol/L    Glucose 79 70 - 125 mg/dL    BUN 15 8 - 22 mg/dL    Creatinine 0.70 0.60 - 1.10 mg/dL    GFR MDRD Af Amer >60 >60 mL/min/1.73m2    GFR MDRD Non Af Amer >60 >60 mL/min/1.73m2    Bilirubin, Total 0.6 0.0 - 1.0 mg/dL    Calcium 9.6 8.5 - 10.5 mg/dL    Protein, Total 7.6 6.0 - 8.0 g/dL    Albumin 3.9 3.5 - 5.0 g/dL    Alkaline Phosphatase 137 (H) 45 - 120 U/L    AST 36 0 - 40 U/L    ALT 42 0 - 45 U/L    Narrative    Fasting Glucose reference range is 70-99 mg/dL per  American Diabetes Association (ADA) guidelines.        Result is/are normal.  Continue current medications.  Call if any questions or concerns.     Please call with questions or contact us using [employer].    Sincerely,    Electronically signed by Kimberly Montano MD

## 2021-06-19 NOTE — PROGRESS NOTES
Family Medicine Office Visit  [facility]  Patient Name: Sherry Yates  Patient Age: 43 y.o.  YOB: 1975  MRN: [id number]    Date of Visit: 2018  Reason for Office Visit:   Chief Complaint   Patient presents with     Establish Care     Annual Exam     Finger Issue     Was gardening in may and thinks that she got pricked in the finger and there now an infection on the left middle finger.            Assessment / Plan / Medical Decision Makin. Morbid obesity (H)  Discussed diet and exercise    2. Visit for screening mammogram  Order placed and scheduling to call with the appointment  - Mammo Screening Bilateral; Future    3. Routine general medical examination at a health care facility  Recommend routine labs in October, flu shot    4. Ulcerative colitis (H)  Follow up as needed for any issues.  Continue with current medications    5. Primary Sclerosing Ascending Cholangitis  Continue to follow up with internist as directed    6. Wound infection  Trial of bactrim to see if any improvement.        Health Maintenance Review  Health Maintenance   Topic Date Due     MAMMOGRAM  1975     INFLUENZA VACCINE RULE BASED (1) 2018     TD 18+ HE  2019     PAP SMEAR  2021     ADVANCE DIRECTIVES DISCUSSED WITH PATIENT  10/17/2022     TDAP ADULT ONE TIME DOSE  Completed         I am having Ms. Yates start on sulfamethoxazole-trimethoprim. I am also having her maintain her mesalamine and levothyroxine.      HPI:  Sherry Yates is a 43 y.o. year old who presents to the office today for establish care.  Pt is new to me with a hx of PSC, pituitary adenoma and UC.  Doing well and all are stable.  Seen by endocrine last month and US done of the thyroid.  States when gardening got stuck in the finger and problems healing since then.  Putting neosporin on it and not seem to be helping.  Pap up to date.  Due for mammogram.  Sleeping and eating  well.        Review of Systems- pertinent positive in bold:  Constitutional: Fever, chills, night sweats, fainting, weight change, fatigue, seizures, dizziness, sleeping difficulties, loud snoring/pauses in breathing  Eyes: change in vision, blurred or double vision, redness/eye pain  Ears, nose, mouth, throat: change in hearing, ear pain, hoarseness, difficulty swallowing, sores in the mouth or throat  Respiratory: shortness of breath, cough, bloody sputum, wheezing  Cardiovascular: chest pain, palpitations   Gastrointestinal: abdominal pain, heartburn/indigestion, nausea/vomiting, change in appetite, change in bowel habits, constipation or diarrhea, rectal bleeding/dark stools, difficulty swallowing  Urinary: painful urination, frequent urination, urinary urgency/incontinence, blood in urine/dark urine, nocturia  Musculoskeletal: backache/back pain (new or increasing), weakness, joint pain/stiffness (new or increasing), muscle cramps, swelling of hands, feet, ankles, leg pain/redness  Skin: change in moles/freckles, rash, nodules  Hematologic/lymphatic: swollen lymph glands, abnormal bruising/bleeding  Endocrine: excessive thirst/urination, cold or heat intolerance  Neurologic/emotional: worrisome memory change, numbness/tingling, anxiety, mood swings      Current Scheduled Meds:  Outpatient Encounter Prescriptions as of 8/14/2018   Medication Sig Dispense Refill     levothyroxine (SYNTHROID, LEVOTHROID) 200 MCG tablet TAKE 1 TABLET(200 MCG) BY MOUTH DAILY 90 tablet 1     mesalamine (ASACOL HD) 800 mg TbEC EC tablet Take 1 tablet (800 mg total) by mouth 3 (three) times a day. 90 tablet 3     sulfamethoxazole-trimethoprim (BACTRIM DS) 800-160 mg per tablet Take 1 tablet by mouth 2 (two) times a day for 10 days. 20 tablet 0     [DISCONTINUED] levothyroxine (SYNTHROID, LEVOTHROID) 200 MCG tablet TAKE 1 TABLET BY MOUTH EVERY DAY 90 tablet 1     [DISCONTINUED] levothyroxine (SYNTHROID, LEVOTHROID) 25 MCG tablet TAKE 1  "TABLET BY MOUTH EVERY DAY 90 tablet 1     No facility-administered encounter medications on file as of 8/14/2018.      Past Medical History:   Diagnosis Date     Adenoma of pituitary (H)      Primary biliary cholangitis with systemic sclerosis (H)      Ulcerative colitis (H)      Past Surgical History:   Procedure Laterality Date     EXPLORATORY LAPAROTOMY       Social History   Substance Use Topics     Smoking status: Never Smoker     Smokeless tobacco: Never Used      Comment: no exposure     Alcohol use No       Objective / Physical Examination:  Vitals:    08/14/18 1316   BP: 126/90   Pulse: 84   Weight: (!) 278 lb (126.1 kg)   Height: 5' 8\" (1.727 m)     Wt Readings from Last 3 Encounters:   08/14/18 (!) 278 lb (126.1 kg)   06/11/18 (!) 276 lb (125.2 kg)   10/17/17 (!) 257 lb 9.6 oz (116.8 kg)     BP Readings from Last 3 Encounters:   08/14/18 126/90   06/11/18 126/88   10/17/17 138/86     Body mass index is 42.27 kg/(m^2).     General Appearance: Alert and oriented, cooperative, affect appropriate, speech clear, in no apparent distress  Head: Normocephalic, atraumatic  Ears: Tympanic membrane clear with landmarks well visualized bilaterally  Eyes: PERRL, fundi appear clear bilaterally. EOMI. Conjunctivae clear and sclerae non-icteric  Nose: Septum midline, nares patent, no visible polyps, mucosa moist and without drainage  Throat: Lips and mucosa moist. Teeth in good repair, pharynx without erythema or exudate  Neck: Supple, trachea midline. No cervical adenopathy  Back: Symmetrical and nontender  Lungs: Clear to auscultation bilaterally. Normal inspiratory and expiratory effort  Cardiovascular: Regular rate, normal S1, S2. No murmurs, rubs, or gallops  Abdomen: Bowel sounds active all four quadrants. Soft, non-tender. No hepatomegaly or splenomegaly. No bruits detected.   Extremities: Pulses 2+ and equal throughout. No edema. Strength equal throughout.  Integumentary: Warm and dry. Without suspicious looking " lesions, middle finger on the left hand, gonzalez DIP area, scab present with minimal surrounding erythema, no pus or drainage.  Neuro: Alert and oriented, follows commands appropriately.     Orders Placed This Encounter   Procedures     Mammo Screening Bilateral   Followup: No Follow-up on file. earlier if needed.         Kimberly Montano MD

## 2021-06-20 NOTE — LETTER
Letter by Kimberly Montano MD at      Author: Kimberly Montano MD Service: -- Author Type: --    Filed:  Encounter Date: 7/14/2020 Status: (Other)         Sherry Yates  [address] [facility] [address]             July 14, 2020         Dear Ms. Yates,    Below are the results from your recent visit:    Resulted Orders   Thyroid Cascade   Result Value Ref Range    TSH 0.88 0.30 - 5.00 uIU/mL   Prolactin   Result Value Ref Range    Prolactin 10.7 0.0 - 20.0 ng/mL   Comprehensive Metabolic Panel   Result Value Ref Range    Sodium 140 136 - 145 mmol/L    Potassium 4.0 3.5 - 5.0 mmol/L    Chloride 105 98 - 107 mmol/L    CO2 25 22 - 31 mmol/L    Anion Gap, Calculation 10 5 - 18 mmol/L    Glucose 108 70 - 125 mg/dL    BUN 12 8 - 22 mg/dL    Creatinine 0.77 0.60 - 1.10 mg/dL    GFR MDRD Af Amer >60 >60 mL/min/1.73m2    GFR MDRD Non Af Amer >60 >60 mL/min/1.73m2    Bilirubin, Total 1.0 0.0 - 1.0 mg/dL    Calcium 8.8 8.5 - 10.5 mg/dL    Protein, Total 7.4 6.0 - 8.0 g/dL    Albumin 3.7 3.5 - 5.0 g/dL    Alkaline Phosphatase 161 (H) 45 - 120 U/L    AST 46 (H) 0 - 40 U/L    ALT 47 (H) 0 - 45 U/L    Narrative    Fasting Glucose reference range is 70-99 mg/dL per  American Diabetes Association (ADA) guidelines.   Lipid Cascade RANDOM   Result Value Ref Range    Cholesterol 196 <=199 mg/dL    Triglycerides 128 <=149 mg/dL    HDL Cholesterol 53 >=50 mg/dL    LDL Calculated 117 <=129 mg/dL    Patient Fasting > 8hrs? Yes    Vitamin D, Total (25-Hydroxy)   Result Value Ref Range    Vitamin D, Total (25-Hydroxy) 26.2 (L) 30.0 - 80.0 ng/mL    Narrative    Deficiency <10.0 ng/mL  Insufficiency 10.0-29.9 ng/mL  Sufficiency 30.0-80.0 ng/mL  Toxicity (possible) >100.0 ng/mL        Vitamin d is low and recommend continue with supplements and will send in a new script.  Prolactin is  normal.  Liver enzymes slightly elevated, which should come down with diet and exercise.  Mesalamine  can sometimes elevate AST and ALT but I think we  can watch for now and will recommend repeat in 4  months.  Cholesterol is normal.  Thyroid is normal.     Please call with questions or contact us using MyChart.    Sincerely,        Electronically signed by Kimberly Montano MD

## 2021-06-20 NOTE — LETTER
Letter by Kimberly Montano MD at      Author: Kimberly Montano MD Service: -- Author Type: --    Filed:  Encounter Date: 1/6/2020 Status: Signed         Sherry Yates  [address] [facility] [address]             January 6, 2020         Dear Ms. Yates,    Below are the results from your recent visit:    Resulted Orders   Thyroid Cascade   Result Value Ref Range    TSH 0.45 0.30 - 5.00 uIU/mL   Lipid Cascade RANDOM   Result Value Ref Range    Cholesterol 189 <=199 mg/dL    Triglycerides 135 <=149 mg/dL    HDL Cholesterol 53 >=50 mg/dL    LDL Calculated 109 <=129 mg/dL    Patient Fasting > 8hrs? Yes    Vitamin D, Total (25-Hydroxy)   Result Value Ref Range    Vitamin D, Total (25-Hydroxy) 27.0 (L) 30.0 - 80.0 ng/mL    Narrative    Deficiency <10.0 ng/mL  Insufficiency 10.0-29.9 ng/mL  Sufficiency 30.0-80.0 ng/mL  Toxicity (possible) >100.0 ng/mL   HM1 (CBC with Diff)   Result Value Ref Range    WBC 10.2 4.0 - 11.0 thou/uL    RBC 4.37 3.80 - 5.40 mill/uL    Hemoglobin 13.1 12.0 - 16.0 g/dL    Hematocrit 39.4 35.0 - 47.0 %    MCV 90 80 - 100 fL    MCH 29.9 27.0 - 34.0 pg    MCHC 33.2 32.0 - 36.0 g/dL    RDW 13.1 11.0 - 14.5 %    Platelets 226 140 - 440 thou/uL    MPV 8.5 7.0 - 10.0 fL    Neutrophils % 70 50 - 70 %    Lymphocytes % 21 20 - 40 %    Monocytes % 6 2 - 10 %    Eosinophils % 3 0 - 6 %    Basophils % 1 0 - 2 %    Neutrophils Absolute 7.2 2.0 - 7.7 thou/uL    Lymphocytes Absolute 2.1 0.8 - 4.4 thou/uL    Monocytes Absolute 0.6 0.0 - 0.9 thou/uL    Eosinophils Absolute 0.3 0.0 - 0.4 thou/uL    Basophils Absolute 0.1 0.0 - 0.2 thou/uL        Vitamin d is low and recommend continue with the vitamin d supplement     Please call with questions or contact us using MyChart.    Sincerely,        Electronically signed by Kimberly Montano MD

## 2021-06-21 NOTE — LETTER
"Letter by Kimberly Montano MD at      Author: Kimberly Montano MD Service: -- Author Type: --    Filed:  Encounter Date: 1/19/2021 Status: (Other)       Sherry NIK Mixrt  [address] [facility] [address]             January 19, 2021         Dear Sherry Yates,    Below are the results from Sherry's recent visit:    Resulted Orders   Comprehensive Metabolic Panel   Result Value Ref Range    Sodium 139 136 - 145 mmol/L    Potassium 4.5 3.5 - 5.0 mmol/L    Chloride 103 98 - 107 mmol/L    CO2 25 22 - 31 mmol/L    Anion Gap, Calculation 11 5 - 18 mmol/L    Glucose 85 70 - 125 mg/dL    BUN 17 8 - 22 mg/dL    Creatinine 0.74 0.60 - 1.10 mg/dL    GFR MDRD Af Amer >60 >60 mL/min/1.73m2    GFR MDRD Non Af Amer >60 >60 mL/min/1.73m2    Bilirubin, Total 1.1 (H) 0.0 - 1.0 mg/dL    Calcium 9.6 8.5 - 10.5 mg/dL    Protein, Total 7.8 6.0 - 8.0 g/dL    Albumin 3.9 3.5 - 5.0 g/dL    Alkaline Phosphatase 179 (H) 45 - 120 U/L    AST 58 (H) 0 - 40 U/L    ALT 63 (H) 0 - 45 U/L    Narrative    Fasting Glucose reference range is 70-99 mg/dL per  American Diabetes Association (ADA) guidelines.        Hello Sherry NIK Yates,     Your result for potassium is normal.  Your liver function is up slightly, a bit more than it has been in the past.  Recommend watching diet and exercise as it is most likely related to \"fatty liver\" and then recheck labs in 6 months.    Kimberly Montano MD     Please call with questions or contact us using [employer].    Sincerely,        Electronically signed by Kimberly Montano MD       "

## 2021-06-21 NOTE — LETTER
Letter by Kimberly Montano MD at      Author: Kimberly Montano MD Service: -- Author Type: --    Filed:  Encounter Date: 11/10/2020 Status: (Other)        Sherry NIK Trudy  [address] [facility] [address]             November 10, 2020         Dear Sherry Yates,    Below are the results from Sherry's recent visit:    Resulted Orders   Comprehensive Metabolic Panel   Result Value Ref Range    Sodium 141 136 - 145 mmol/L    Potassium 4.0 3.5 - 5.0 mmol/L    Chloride 105 98 - 107 mmol/L    CO2 30 22 - 31 mmol/L    Anion Gap, Calculation 6 5 - 18 mmol/L    Glucose 98 70 - 125 mg/dL    BUN 16 8 - 22 mg/dL    Creatinine 0.77 0.60 - 1.10 mg/dL    GFR MDRD Af Amer >60 >60 mL/min/1.73m2    GFR MDRD Non Af Amer >60 >60 mL/min/1.73m2    Bilirubin, Total 1.1 (H) 0.0 - 1.0 mg/dL    Calcium 9.4 8.5 - 10.5 mg/dL    Protein, Total 7.9 6.0 - 8.0 g/dL    Albumin 3.9 3.5 - 5.0 g/dL    Alkaline Phosphatase 184 (H) 45 - 120 U/L    AST 43 (H) 0 - 40 U/L    ALT 54 (H) 0 - 45 U/L    Narrative    Fasting Glucose reference range is 70-99 mg/dL per  American Diabetes Association (ADA) guidelines.   HM1 (CBC with Diff)   Result Value Ref Range    WBC 10.9 4.0 - 11.0 thou/uL    RBC 4.55 3.80 - 5.40 mill/uL    Hemoglobin 13.1 12.0 - 16.0 g/dL    Hematocrit 41.4 35.0 - 47.0 %    MCV 91 80 - 100 fL    MCH 28.8 27.0 - 34.0 pg    MCHC 31.6 (L) 32.0 - 36.0 g/dL    RDW 15.0 (H) 11.0 - 14.5 %    Platelets 250 140 - 440 thou/uL    MPV 11.5 8.5 - 12.5 fL    Neutrophils % 69 50 - 70 %    Lymphocytes % 23 20 - 40 %    Monocytes % 5 2 - 10 %    Eosinophils % 2 0 - 6 %    Basophils % 1 0 - 2 %    Immature Granulocyte % 1 (H) <=0 %    Neutrophils Absolute 7.6 2.0 - 7.7 thou/uL    Lymphocytes Absolute 2.5 0.8 - 4.4 thou/uL    Monocytes Absolute 0.5 0.0 - 0.9 thou/uL    Eosinophils Absolute 0.2 0.0 - 0.4 thou/uL    Basophils Absolute 0.1 0.0 - 0.2 thou/uL    Immature Granulocyte Absolute 0.1 (H) <=0.0 thou/uL       Liver enzymes still up slightly and  recommend an ultrasound of the liver to check on things.  Please my chart message me if this is ok and I will place the order.     Please call with questions or contact us using [employer].    Sincerely,        Electronically signed by Kimberly Montano MD

## 2021-06-22 NOTE — PATIENT INSTRUCTIONS - HE
1. Return for your annual physical.  2. Start taking vitamin D 1000 units daily.  3. Please follow up with your liver/colon doctor per his recommendation.  4. If you would like to discuss weight further, please return whenever.

## 2021-06-22 NOTE — PROGRESS NOTES
[facility]  Internal Medicine - Office Visit    Patient: Sherry Yates   MRN: [id number]   Date of Service: 01/04/19   Patient Care Team:  Kimberly Montano MD as PCP - General (Family Medicine)  James Alvarez MD as Physician (Gastroenterology)  Aimee Sanchez DO as Physician (Obstetrics and Gynecology)    ASSESSMENT/PLAN     Sherry Yates is a 44 y/o with morbid obesity, ulcerative colitis, primary sclerosing ascending cholangitis, hypothyroidism, and prolactinoma here to establish care.     1) Ulcerative colitis  Stable, last colonoscopy in 2017 showed just mild active colitis and biopsy neg for dysplasia. Minimal symptoms. Plan:  --She will call Dr. Alvarez of GI to determine when to follow up with him    2) Primary sclerosing ascending cholangitis  Stable, asymptomatic.    --Will follow up with GI per above    3) Prolactinoma  Recent prolactin level normal, no longer on medical therapy. No prior hx of surgery. Asymptomatic.   --Follow up with Dr. Phelan of Endo in March    4) Hypothyroidism  Takes synthroid. Recent thyroid levels checked on 12/20/2018 normal.  --Follow up with Dr. Phelan of Endo in March    5) Vitamin D deficiency  --Start taking vitamin D 1000 units daily    6) Morbid obesity:   She is 292# today. Back in 2017 she was able to lose 30# with using Weight Watchers. Attributes weight gain back to her stress over last year. Wants to try weight watchers again and will be more disciplined now that she is not under the stress she was under before.  --Weight watchers  --Offered weight loss clinic and also discussed with her medications/surgeries, but patient would really like to try just lifestyle modifications first. With her PSC, she worries too about hepatic clearance of meds and tries to avoid meds whenever possible.    She should reschedule an annual physical.     Sherri Ellis MD  Internal Medicine and Pediatrics  [facility]  Pager  [phone]    SUBJECTIVE     Sherry Yates is a 42 y/o with morbid obesity, ulcerative colitis, primary sclerosing ascending cholangitis, hypothyroidism, and prolactinoma here to establish care. She was previously seen by Dr. Angel for several years, who then is no longer practicing at same clinic. She wants to establish care here.     She has no major concerns today except to establish care.     1) Ulcerative colitis: She sees Dr. Alvarez of GI for her ulcerative colitis. Her last colonoscopy was in 2017 and this showed just mild active colitis, bx neg for dysplasia. She has occasional intestinal cramping and fecal urgency but no hematochezia. She takes mesalamine and feels her symptoms are under control and at baseline. She is not sure when she is supposed to follow up with them.     2) Primary sclerosing ascending cholangitis: Diagnosed in 1994 when she had jaundice. Follows with Dr. Alvarez. Told in future she would like need liver transplant.     3) Prolactinoma: She has prolactin levels checked regularly. Checked by Dr. Phelan on 12/20/2018 and wnl. She used to be on treatment for this, but not anymore. Never had surgery for it. Has appt with Dr. Phelan in March.    4) Hypothyroidism: Takes synthroid. Recent thyroid levels checked on 12/20/2018 normal.    5) Vitamin D deficiency: Vitamin D level is low. She forgot to start taking vitamin D.     6) Morbid obesity: She is 292# today. Back in 2017 she was able to lose 30# with using Weight [employer]. Then her  lost his job and she was in Grad school, so she was under lots of stress and not watching what she eats and not getting physical activity. She is still on weight watchers and knows she needs to start being more compliant with it. She thinks this is possible because she is now done with grad school and  has new job. She does not want to go to weight loss clinic or start any weight loss medications.    Review of Systems  Pertinent items are noted  "in HPI    Past Medical/Surgical History  Reviewed and updated as appropriate    Immunizations  UTD    Medications    Current Outpatient Medications:      levothyroxine (SYNTHROID, LEVOTHROID) 200 MCG tablet, TAKE 1 TABLET(200 MCG) BY MOUTH DAILY, Disp: 90 tablet, Rfl: 1     mesalamine (ASACOL HD) 800 mg TbEC EC tablet, TAKE 1 TABLET BY MOUTH THREE TIMES DAILY, Disp: 180 tablet, Rfl: 0    Allergies  Allergies   Allergen Reactions     Codeine      Opioids - Morphine Analogues Itching     Family History  PCOS and endometriosis in family members. ? HTN in sister and Dad.     Social History  Reviewed and updated as appropriate. Graduated recently from [employer] - - Masters in History. She teaches history and Vietnamese. . 1 daughter who is 10 y/o. No smoking, occasional alcohol, no illegal drugs.         OBJECTIVE       /60 (Patient Site: Right Arm, Patient Position: Sitting, Cuff Size: Adult Regular)   Pulse 80   Resp 16   Ht 5' 8\" (1.727 m)   Wt (!) 292 lb 14.4 oz (132.9 kg)   LMP 10/04/2018 (Approximate)   SpO2 99%   BMI 44.54 kg/m      General Appearance:    Alert, cooperative, no distress, appears stated age, obese    Lungs:     Clear to auscultation bilaterally, respirations unlabored   Chest Wall:    No tenderness or deformity    Heart:    Regular rate and rhythm, S1 and S2 normal, no murmur, rub    or gallop   Abdomen:     Soft, non-tender, obese   Neurologic:   CNII-XII grossly intact, grossly normal strength and sensation     Labs/imaging/studies:  Colonoscopy 12/2017:  The terminal ileum appeared normal.  The ascending colon appeared normal. Biopsies were taken with a cold   forceps for histology, labeled as R-colon. Estimated blood loss: none.  A continuous area of nonbleeding ulcerated mucosa with no stigmata of   recent bleeding was present in the sigmoid colon and in the   descending colon. Biopsies were taken with a cold forceps for   histology, labeled as L-colon.  The exam was " otherwise without abnormality, although the colon length   felt diminished.    Pathology 12/29/2017:  Chronic active colitis with mild activity. No dysplasia.

## 2021-06-23 ENCOUNTER — OFFICE VISIT - HEALTHEAST (OUTPATIENT)
Dept: FAMILY MEDICINE | Facility: CLINIC | Age: 46
End: 2021-06-23

## 2021-06-23 DIAGNOSIS — K51.80 OTHER ULCERATIVE COLITIS WITHOUT COMPLICATION (H): ICD-10-CM

## 2021-06-23 DIAGNOSIS — E66.01 MORBID OBESITY (H): ICD-10-CM

## 2021-06-23 DIAGNOSIS — K64.4 EXTERNAL HEMORRHOIDS: ICD-10-CM

## 2021-06-23 DIAGNOSIS — Z12.31 VISIT FOR SCREENING MAMMOGRAM: ICD-10-CM

## 2021-06-23 DIAGNOSIS — R05.3 CHRONIC COUGH: ICD-10-CM

## 2021-06-23 DIAGNOSIS — D35.2 BENIGN NEOPLASM OF PITUITARY GLAND AND CRANIOPHARYNGEAL DUCT (POUCH) (H): ICD-10-CM

## 2021-06-23 DIAGNOSIS — D35.3 BENIGN NEOPLASM OF PITUITARY GLAND AND CRANIOPHARYNGEAL DUCT (POUCH) (H): ICD-10-CM

## 2021-06-23 DIAGNOSIS — R05.9 COUGH: ICD-10-CM

## 2021-06-23 DIAGNOSIS — I10 ESSENTIAL HYPERTENSION, BENIGN: ICD-10-CM

## 2021-06-24 ENCOUNTER — COMMUNICATION - HEALTHEAST (OUTPATIENT)
Dept: FAMILY MEDICINE | Facility: CLINIC | Age: 46
End: 2021-06-24

## 2021-06-24 NOTE — TELEPHONE ENCOUNTER
----- Message from Cruz Phelan MD sent at 2/24/2019  6:33 AM CST -----  Thyroid function is abnormal, also cholesterol is high will discuss in details in clinic.

## 2021-06-24 NOTE — TELEPHONE ENCOUNTER
[place]    Was suppose to be seen 03.01.2019. appt cancelled due to provider out of the office. Patient is upset and would like to get in sooner than already DB for. 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 States she is available last week of march due to spring break.    Appt currently is:  Date: 5/13/2019 Status: Jori   Time: 8:30 AM Length: 30   Visit Type: OFFICE VISIT [[id number]] Copay: $0.00   Provider: Cruz Phelan MD Department: [facility]   Referring Provider: SUSHANT BUSTILLOS CSN: [id number]   Notes: 6 mos f/u      She can be reached @ [phone]

## 2021-06-24 NOTE — TELEPHONE ENCOUNTER
Pt called back, I informed of Dr Phelan's below message. The pt understands. I have placed the orders and sent the RX's. The pt will keep her appt with Dr Phelan.

## 2021-06-24 NOTE — TELEPHONE ENCOUNTER
Labs rev, please increase synthroid to 112 mcg 2 tablets daily. Take vit d 50781 u/week. Check tsh  ft4 in 8 weeks.

## 2021-06-24 NOTE — TELEPHONE ENCOUNTER
I called LM for the pt to c/b to inform of the below and that we are working on getting her in sooner with a new Endocrinologist.

## 2021-06-25 ENCOUNTER — HOSPITAL ENCOUNTER (OUTPATIENT)
Dept: MAMMOGRAPHY | Facility: CLINIC | Age: 46
Discharge: HOME | End: 2021-06-25
Attending: FAMILY MEDICINE
Payer: COMMERCIAL

## 2021-06-25 DIAGNOSIS — Z12.31 VISIT FOR SCREENING MAMMOGRAM: ICD-10-CM

## 2021-06-26 NOTE — PROGRESS NOTES
Judith Yates,    Your result is/are normal for your xray.  I would try the zyrtec again and if no improvement within a few weeks, then let me know and we will get you set up to see pulmonology.  Continue current medications if on any.  Call or my chart message me if any questions or concerns.    Kimberly Montano MD

## 2021-06-28 ENCOUNTER — AMBULATORY - HEALTHEAST (OUTPATIENT)
Dept: FAMILY MEDICINE | Facility: CLINIC | Age: 46
End: 2021-06-28

## 2021-06-28 DIAGNOSIS — R92.8 ABNORMAL MAMMOGRAM: ICD-10-CM

## 2021-06-28 NOTE — PROGRESS NOTES
Progress Notes by Kimberly Montano MD at 11/18/2019  7:30 AM     Author: Kimberly Montano MD Service: -- Author Type: Physician    Filed: 11/18/2019  7:57 AM Encounter Date: 11/18/2019 Status: Signed    : Kimberly Montano MD (Physician)       FEMALE PREVENTATIVE EXAM    Assessment and Plan:       1. Visit for screening mammogram  - Mammo Screening Bilateral; Future    2. Encounter for general adult medical examination without abnormal findings  Routine labs in 6 weeks and will call with the results  - Lipid Cascade RANDOM; Future  - HM1(CBC and Differential); Future    3. Hypothyroidism, unspecified type  Seen by endocrinology and off medications for the past few weeks.  will restart medications and then recheck labs in 6 weeks.  - levothyroxine (SYNTHROID, LEVOTHROID) 112 MCG tablet; Take 2 tablets (224 mcg total) by mouth daily.  Dispense: 180 tablet; Refill: 1  - Thyroid Cascade; Future  - ergocalciferol (ERGOCALCIFEROL) 50,000 unit capsule; Take 1 capsule (50,000 Units total) by mouth once a week.  Dispense: 12 capsule; Refill: 1    4. Vitamin deficiency  Restart medications  - levothyroxine (SYNTHROID, LEVOTHROID) 112 MCG tablet; Take 2 tablets (224 mcg total) by mouth daily.  Dispense: 180 tablet; Refill: 1  - Vitamin D, Total (25-Hydroxy); Future  - ergocalciferol (ERGOCALCIFEROL) 50,000 unit capsule; Take 1 capsule (50,000 Units total) by mouth once a week.  Dispense: 12 capsule; Refill: 1    5. Pain in joint involving ankle and foot, left  Referral to the ankle clinic to see about further interventions   - Ambulatory referral to Orthopedic Surgery    6. Morbid obesity (H)  discussed exercise - difficulty due to ankle pain     7. Ulcerative colitis without complications, unspecified location (H)  Stable - seeing GI and no issues        Next follow up:  Return in 1 year (on 11/18/2020).    Immunization Review  Adult Imm Review: Due today, orders placed  No tobacco use.    I discussed the following with  "the patient:   Adult Healthy Living: Importance of regular exercise  Healthy nutrition  Weight loss referral options  Getting adequate sleep  Stress management  Use of seat belts  Distracted driving  Helmets    I have had an Advance Directives discussion with the patient.    Subjective:   Chief Complaint: Sherry Yates is an 44 y.o. female here for a preventative health visit.     HPI:  Left foot and ankle pain for the past few months.  \"tight\" ankles since the spring.  Wake in the am then tight and would walk on them and losen up.  Selling and feeling like some skin changes.  Some left knee pain thought to be secondary to the ankle pain.  Weakness.     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  Seen by endocrinology but out of the medication for the past few weeks.  Seen by provider who went on medical leave and then seen by [facility] but provider at that time unconcerned with anything.  Was diagnosed with hyperprolactinemia 2/2 to prolactin adenoma at age 14. At that time she had nipple discharge.  Was seen by  and  after that.  Was on bromocriptine in past ( from age 14 till age 32). Went off of medication as she was pregnant at that time.  Prolactin was in range after that.  Not on medication since age 32.      Healthy Habits  Are you taking a daily aspirin? No  Do you typically exercising at least 40 min, 3-4 times per week?  NO  Do you usually eat at least 4 servings of fruit and vegetables a day, include whole grains and fiber and avoid regularly eating high fat foods? Yes  Have you had an eye exam in the past two years? Yes  Do you see a dentist twice per year? Yes  Do you have any concerns regarding sleep? No    Safety Screen  If you own firearms, are they secured in a locked gun cabinet or with trigger locks? The patient does not own any firearms  Do you feel you are safe where you are living?: Yes (11/18/2019  7:18 AM)  Do you feel you are safe in your relationship(s)?: Yes " "(1/4/2019  4:35 PM)      Review of Systems:  Please see above.  The rest of the review of systems are negative for all systems.     Pap History:   Last 3 PAP results:  No results found for: PAP  Cancer Screening       Status Date      MAMMOGRAM Overdue 8/24/2019      Done 8/24/2018 MAMMO SCREENING BILATERAL    PAP SMEAR Next Due 8/1/2021      Done 8/1/2016           Patient Care Team:  Kimberly Montano MD as PCP - General (Family Medicine)  James Alvarez MD as Physician (Gastroenterology)  Aimee Sanchez DO as Physician (Obstetrics and Gynecology)  Sherri Ellis MD as Assigned PCP        History     Reviewed By Date/Time Sections Reviewed    Esther Ozuna CMA 11/18/2019  7:21 AM Tobacco    Esther Ozuna CMA 11/18/2019  7:18 AM Medical, Surgical, Tobacco, Alcohol, Drug Use, Sexual Activity, Family            Objective:   Vital Signs:   Visit Vitals  /86 (Patient Site: Right Arm, Patient Position: Sitting, Cuff Size: Adult Large)   Pulse 63   Temp 97.7  F (36.5  C) (Oral)   Ht 5' 9\" (1.753 m) Comment: w/shoes   Wt (!) 297 lb 6.4 oz (134.9 kg)   LMP 11/06/2019   SpO2 97%   BMI 43.92 kg/m           PHYSICAL EXAM    Physical Exam:  General Appearance: Alert, cooperative, no distress, appears stated age   Head: Normocephalic, without obvious abnormality, atraumatic  Eyes: PERRL, conjunctiva/corneas clear, EOM's intact   Ears: Normal TM's and external ear canals, both ears  Nose:Nares normal, septum midline,mucosa normal, no drainage    Throat:Lips, mucosa, and tongue normal; teeth and gums normal  Neck: Supple, symmetrical, trachea midline, no adenopathy;  thyroid: not enlarged, symmetric, no tenderness/mass/nodules  Back: Symmetric, no curvature, ROM normal,  Lungs: Clear to auscultation bilaterally, respirations unlabored  Heart: Regular rate and rhythm, S1 and S2 normal, no murmur, rub, or gallop  Abdomen: Soft, non-tender, bowel sounds active all four quadrants,  no " masses, no organomegaly  Extremities: Extremities normal, atraumatic, no cyanosis or edema  Skin: Skin color, texture, turgor normal, no rashes or lesions  Lymph nodes: Cervical, supraclavicular, and axillary nodes normal  Neurologic: Normal  Breast:  Normal, inverted nipple on the right side - mammogram ordered.            The 10-year ASCVD risk score (Ana Lilia AVILA Jr., et al., 2013) is: 1.2%    Values used to calculate the score:      Age: 44 years      Sex: Female      Is Non- : No      Diabetic: No      Tobacco smoker: No      Systolic Blood Pressure: 140 mmHg      Is BP treated: No      HDL Cholesterol: 54 mg/dL      Total Cholesterol: 216 mg/dL         Medication List          Accurate as of November 18, 2019  7:54 AM. If you have any questions, ask your nurse or doctor.            CONTINUE taking these medications    ergocalciferol 50,000 unit capsule  Also known as:  ERGOCALCIFEROL  INSTRUCTIONS:  Take 1 capsule (50,000 Units total) by mouth once a week.        levothyroxine 112 MCG tablet  Also known as:  SYNTHROID, LEVOTHROID  INSTRUCTIONS:  Take 2 tablets (224 mcg total) by mouth daily.        mesalamine 800 mg Tbec EC tablet  Also known as:  ASACOL HD  INSTRUCTIONS:  TAKE 1 TABLET BY MOUTH THREE TIMES DAILY              Where to Get Your Medications      These medications were sent to [id number] - [place], [place] - [address] AT [facility] [address]  [address] [facility] [address]    Phone:  [phone]     ergocalciferol 50,000 unit capsule    levothyroxine 112 MCG tablet         Additional Screenings Completed Today:

## 2021-07-03 NOTE — ADDENDUM NOTE
Addendum Note by Uzma Gage RN at 3/13/2019  3:41 PM     Author: Uzma Gage RN Service: -- Author Type: Registered Nurse    Filed: 3/13/2019  3:41 PM Encounter Date: 2/28/2019 Status: Signed    : Uzma Gage RN (Registered Nurse)    Addended by: UZMA GAGE on: 3/13/2019 03:41 PM        Modules accepted: Orders

## 2021-07-04 NOTE — PATIENT INSTRUCTIONS - HE
Patient Instructions by Kimberly Montano MD at 6/23/2021  9:30 AM     Author: Kimberly Montano MD Service: -- Author Type: Physician    Filed: 6/23/2021  9:37 AM Encounter Date: 6/23/2021 Status: Addendum    : Kimberly Montano MD (Physician)    Related Notes: Original Note by Kimberly Montano MD (Physician) filed at 6/23/2021  9:34 AM       Use of preparation H or tucks wipes  Patient Education     Hemorrhoids    Hemorrhoids are swollen and inflamed veins inside the rectum and near the anus. The rectum is the last several inches of the colon. The anus is the passage between the rectum and the outside of the body.  Causes  The veins can become swollen due to increased pressure in them. This is most often caused by:    Chronic constipation or diarrhea    Straining when having a bowel movement    Sitting too long on the toilet    A low-fiber diet    Pregnancy  Symptoms    Bleeding from the rectum (this may be noticeable after bowel movements)    Lump near the anus    Itching around the anus    Pain around the anus  There are different types of hemorrhoids. Depending on the type you have and the severity, you may be able to treat yourself at home. In some cases, a procedure may be the best treatment option. Your healthcare provider can tell you more about this, if needed.  Home care  General care    To get relief from pain or itching, try:  ? Medicines. Your healthcare provider may recommend stool softeners, suppositories, or laxatives to help manage constipation. Use these exactly as directed.  ? Sitz baths. A sitz bath involves sitting in a few inches of warm bath water. Be careful not to make the water so hot that you burn yourself--test it before sitting in it. Soak for about 10 to 15 minutes a few times a day. This may help relieve pain.  ? Topical products. Your healthcare provider may prescribe or recommend creams, ointments, or pads that can be applied to the hemorrhoid. Use these exactly as  directed.  Tips to help prevent hemorrhoids    Eat more fiber. Fiber adds bulk to stool and absorbs water as it moves through your colon. This makes stool softer and easier to pass.  ? Increase the fiber in your diet with more fiber-rich foods. These include fresh fruit, vegetables, and whole grains.  ? Take a fiber supplement or bulking agent, if advised by your healthcare provider. These include products such as psyllium or methylcellulose.    Drink more water. Your healthcare provider may direct you to drink plenty of water. This can help keep stool soft.    Be more active. Frequent exercise aids digestion and helps prevent constipation. It may also help make bowel movements more regular.    Dont strain during bowel movements. This can make hemorrhoids more likely. Also, dont sit on the toilet for long periods of time.  Follow-up care  Follow up with your healthcare provider as advised. If a culture or imaging tests were done, someone will let you know the results when they are ready. This may take a few days or longer. If your healthcare provider recommends a procedure for your hemorrhoids, these options can be discussed. Options may include surgery and outpatient office treatments.  When to seek medical advice  Call your healthcare provider right away if any of these occur:    Increased bleeding from the rectum    Increased pain around the rectum or anus    Weakness or dizziness  Call 911  Call 911 if any of these occur:    Trouble breathing or swallowing    Fainting or loss of consciousness    Unusually fast heart rate    Vomiting blood    Large amounts of blood in stool or black, tarry stools  Date Last Reviewed: 9/1/2017 2000-2017 [employer]. [address] [place] [address], [place] [address]. All rights reserved. This information is not intended as a substitute for professional medical care. Always follow your healthcare professional's instructions.

## 2021-07-04 NOTE — LETTER
Letter by Kimberly Montano MD at      Author: Kimberly Montano MD Service: -- Author Type: --    Filed:  Encounter Date: 6/24/2021 Status: (Other)         Sherry Yates  [address] [facility] [address]             June 24, 2021         Dear Ms. Yates,    Below are the results from your recent visit:    Resulted Orders   XR Chest 2 Views    Narrative    EXAM DATE:         06/23/2021    EXAM: X-RAY CHEST, 2 VIEWS, FRONTAL AND LATERAL  LOCATION: [facility]  DATE/TIME: 6/23/2021 10:00 AM    INDICATION: Cough.  COMPARISON: 02/24/2014.    IMPRESSION: Negative chest. Lungs are clear.                 Helpilar Yates,     Your result is/are normal for your xray.  I would try the zyrtec again and if no improvement within a few weeks, then let me know and we will get you set up to see pulmonology.  Continue current medications if on any.  Call or my chart message me if any questions or concerns.     Please call with questions or contact us using [employer].    Sincerely,        Electronically signed by Kimberly Montano MD

## 2021-07-05 ENCOUNTER — HOSPITAL ENCOUNTER (OUTPATIENT)
Dept: MAMMOGRAPHY | Facility: CLINIC | Age: 46
Discharge: HOME | End: 2021-07-05
Attending: FAMILY MEDICINE
Payer: COMMERCIAL

## 2021-07-05 DIAGNOSIS — N64.89 BREAST ASYMMETRY: ICD-10-CM

## 2021-07-06 ENCOUNTER — COMMUNICATION - HEALTHEAST (OUTPATIENT)
Dept: INTERNAL MEDICINE | Facility: CLINIC | Age: 46
End: 2021-07-06

## 2021-07-06 VITALS
RESPIRATION RATE: 20 BRPM | TEMPERATURE: 98.1 F | HEART RATE: 64 BPM | BODY MASS INDEX: 46.46 KG/M2 | WEIGHT: 293 LBS | SYSTOLIC BLOOD PRESSURE: 124 MMHG | DIASTOLIC BLOOD PRESSURE: 92 MMHG

## 2021-07-06 NOTE — PROGRESS NOTES
Progress Notes by Kimberly Montano MD at 7/5/2021  8:30 AM     Author: Kimberly Montano MD Service: -- Author Type: Physician    Filed: 7/6/2021  7:12 AM Date of Service: 7/5/2021  8:30 AM Status: Signed    : Kimberly Montano MD (Physician)       Judith Yates,    Your result is/are normal. Call or my chart message me if any questions or concerns.    Kimberly Montano MD

## 2021-07-22 NOTE — PROGRESS NOTES
"Progress Notes by Kimberly Montano MD at 6/23/2021  9:30 AM     Author: Kimberly Montano MD Service: -- Author Type: Physician    Filed: 6/29/2021 12:52 AM Encounter Date: 6/23/2021 Status: Signed    : Kimberly Montano MD (Physician)           Assessment & Plan     Visit for screening mammogram  - Mammo Screening Bilateral    External hemorrhoids  Discussed treatment options and pt would like to wait and do symptom management at this time    Essential hypertension, benign  Stable after 2nd reading.  Will continue to monitor    Other ulcerative colitis without complication (H)  Stable.  On medications and no complications    Benign neoplasm of pituitary gland and craniopharyngeal duct (pouch) (H)stable labs for the fall    Morbid obesity (H)  Recommend diet and exercise.      Chronic cough  Will do xr.  Trial of zyrtec again and if no improvement then return to clinic  - XR Chest 2 Views      Review of external notes as documented in note  Ordering of each unique test  Prescription drug management  35 minutes spent on the date of the encounter doing chart review, history and exam, documentation and further activities per the note       BMI:   Estimated body mass index is 46.46 kg/m  as calculated from the following:    Height as of 11/5/20: 5' 8.25\" (1.734 m).    Weight as of this encounter: 307 lb 12.8 oz (139.6 kg).   The following high BMI interventions were performed this visit: encouragement to exercise and weight monitoring    Return in about 4 months (around 10/23/2021) for Annual physical.    Kimberly Montano MD  [facility]    Adalberto Yates is 46 y.o. and presents today for the following health issues   HPI   Planning trip to discontinue.  bp still high and taking medication regularly.  No problems with the medication.    Hemorrhoids - occasionally a pain.  Over the counter medication not typically helping.  Occasional bleeding but not prolonged.  Hx of UC and no " other symptoms with that - well controlled    Hx of prolactinoma and repeat labs planned in October.  Doing well.  Seen by endocrine in 2019 and reviewed note     Still having a cough - not been taking the zyrtec.  Feels like she is a smoker but not.  Ongoing since the fall.  Phlegmy at times.            Review of Systems  A review of systems was obtained and is negative other than what is stated in the HPI.         Objective    BP (!) 124/92 (Patient Site: Right Arm, Patient Position: Sitting, Cuff Size: Adult Large)   Pulse 64   Temp 98.1  F (36.7  C) (Oral)   Resp 20   Wt (!) 307 lb 12.8 oz (139.6 kg)   LMP 05/15/2021 (Approximate)   Breastfeeding No   BMI 46.46 kg/m    Body mass index is 46.46 kg/m .  Physical Exam    General Appearance: Alert and oriented, cooperative, affect appropriate, speech clear, in no apparent distress  Neuro: Alert and oriented, follows commands appropriately.           Kimberly Montano MD  Family Medicine  [facility]

## 2021-07-22 NOTE — LETTER
Letter by Kimberly Montano MD at      Author: Kimberly Montano MD Service: -- Author Type: --    Filed:  Encounter Date: 7/6/2021 Status: (Other)         Sherry Yates  [address] [address]             July 6, 2021         Dear Ms. Yates,    Below are the results from your recent visit:    Resulted Orders   Mammo Diagnostic Left    Narrative    EXAM: MAMMO DIAGNOSTIC 3D LEFT  LOCATION: [facility] SERVICES  [facility]  DATE/TIME: 7/5/2021 8:54 AM    INDICATION:  46-year-old female called back from screening for single view asymmetry in the slightly superior left breast anterior depth seen only on the MLO view.    COMPARISON: Prior mammogram exams, including 6/25/2021, 12/30/2019, 8/24/2018, 11/13/2013.    MAMMOGRAPHIC FINDINGS: Additional views of the left breast were performed. There are scattered areas of fibroglandular density.  Breast tomosynthesis was used in interpretation. The previously described asymmetry in the superior retroareolar breast at   anterior depth does not persist on additional mammographic views, consistent with summation artifact from overlapping breast tissue.       Impression    Resolved asymmetry consistent with summation artifact from overlapping breast tissue. Recommend routine annual screening mammography.    ACR BI-RADS Category 1: Negative.              Judith Yates,     Your result is/are normal. Call or my chart message me if any questions or concerns.     Please call with questions or contact us using [employer].    Sincerely,        Electronically signed by Kimberly Montano MD

## 2021-08-15 ENCOUNTER — HEALTH MAINTENANCE LETTER (OUTPATIENT)
Age: 46
End: 2021-08-15

## 2021-09-02 ENCOUNTER — MYC MEDICAL ADVICE (OUTPATIENT)
Dept: FAMILY MEDICINE | Facility: CLINIC | Age: 46
End: 2021-09-02

## 2021-09-02 DIAGNOSIS — E03.9 HYPOTHYROIDISM, UNSPECIFIED TYPE: Primary | ICD-10-CM

## 2021-09-03 RX ORDER — LEVOTHYROXINE SODIUM 112 UG/1
112 TABLET ORAL DAILY
Qty: 30 TABLET | Refills: 0 | Status: SHIPPED | OUTPATIENT
Start: 2021-09-03 | End: 2021-10-25

## 2021-10-10 ENCOUNTER — HEALTH MAINTENANCE LETTER (OUTPATIENT)
Age: 46
End: 2021-10-10

## 2021-10-25 ENCOUNTER — OFFICE VISIT (OUTPATIENT)
Dept: FAMILY MEDICINE | Facility: CLINIC | Age: 46
End: 2021-10-25
Payer: COMMERCIAL

## 2021-10-25 VITALS
HEART RATE: 56 BPM | DIASTOLIC BLOOD PRESSURE: 78 MMHG | WEIGHT: 274.3 LBS | OXYGEN SATURATION: 100 % | BODY MASS INDEX: 41.57 KG/M2 | SYSTOLIC BLOOD PRESSURE: 124 MMHG | HEIGHT: 68 IN

## 2021-10-25 DIAGNOSIS — E22.1 HYPERPROLACTINEMIA (H): Primary | ICD-10-CM

## 2021-10-25 DIAGNOSIS — Z00.00 ROUTINE GENERAL MEDICAL EXAMINATION AT A HEALTH CARE FACILITY: ICD-10-CM

## 2021-10-25 DIAGNOSIS — E03.9 HYPOTHYROIDISM, UNSPECIFIED TYPE: ICD-10-CM

## 2021-10-25 DIAGNOSIS — E66.01 MORBID OBESITY (H): ICD-10-CM

## 2021-10-25 DIAGNOSIS — K51.80 OTHER ULCERATIVE COLITIS WITHOUT COMPLICATION (H): ICD-10-CM

## 2021-10-25 DIAGNOSIS — Z11.59 NEED FOR HEPATITIS C SCREENING TEST: ICD-10-CM

## 2021-10-25 DIAGNOSIS — N92.1 MENORRHAGIA WITH IRREGULAR CYCLE: ICD-10-CM

## 2021-10-25 DIAGNOSIS — Z11.4 SCREENING FOR HIV (HUMAN IMMUNODEFICIENCY VIRUS): ICD-10-CM

## 2021-10-25 DIAGNOSIS — D35.2 PITUITARY ADENOMA (H): ICD-10-CM

## 2021-10-25 PROBLEM — D35.3 BENIGN NEOPLASM OF PITUITARY GLAND AND CRANIOPHARYNGEAL DUCT (POUCH) (H): Status: ACTIVE | Noted: 2021-10-25

## 2021-10-25 PROBLEM — K83.09 CHOLANGITIS (H): Status: ACTIVE | Noted: 2021-10-25

## 2021-10-25 LAB
ALBUMIN SERPL-MCNC: 4 G/DL (ref 3.5–5)
ALP SERPL-CCNC: 206 U/L (ref 45–120)
ALT SERPL W P-5'-P-CCNC: 35 U/L (ref 0–45)
ANION GAP SERPL CALCULATED.3IONS-SCNC: 11 MMOL/L (ref 5–18)
AST SERPL W P-5'-P-CCNC: 40 U/L (ref 0–40)
BASOPHILS # BLD AUTO: 0.1 10E3/UL (ref 0–0.2)
BASOPHILS NFR BLD AUTO: 1 %
BILIRUB SERPL-MCNC: 1.1 MG/DL (ref 0–1)
BUN SERPL-MCNC: 17 MG/DL (ref 8–22)
CALCIUM SERPL-MCNC: 10 MG/DL (ref 8.5–10.5)
CHLORIDE BLD-SCNC: 103 MMOL/L (ref 98–107)
CO2 SERPL-SCNC: 25 MMOL/L (ref 22–31)
CREAT SERPL-MCNC: 0.84 MG/DL (ref 0.6–1.1)
EOSINOPHIL # BLD AUTO: 0.1 10E3/UL (ref 0–0.7)
EOSINOPHIL NFR BLD AUTO: 2 %
ERYTHROCYTE [DISTWIDTH] IN BLOOD BY AUTOMATED COUNT: 13.2 % (ref 10–15)
GFR SERPL CREATININE-BSD FRML MDRD: 82 ML/MIN/1.73M2
GLUCOSE BLD-MCNC: 74 MG/DL (ref 70–125)
HCT VFR BLD AUTO: 41.9 % (ref 35–47)
HGB BLD-MCNC: 13.1 G/DL (ref 11.7–15.7)
IMM GRANULOCYTES # BLD: 0 10E3/UL
IMM GRANULOCYTES NFR BLD: 0 %
LYMPHOCYTES # BLD AUTO: 2 10E3/UL (ref 0.8–5.3)
LYMPHOCYTES NFR BLD AUTO: 22 %
MCH RBC QN AUTO: 29.7 PG (ref 26.5–33)
MCHC RBC AUTO-ENTMCNC: 31.3 G/DL (ref 31.5–36.5)
MCV RBC AUTO: 95 FL (ref 78–100)
MONOCYTES # BLD AUTO: 0.4 10E3/UL (ref 0–1.3)
MONOCYTES NFR BLD AUTO: 5 %
NEUTROPHILS # BLD AUTO: 6.4 10E3/UL (ref 1.6–8.3)
NEUTROPHILS NFR BLD AUTO: 71 %
PLATELET # BLD AUTO: 233 10E3/UL (ref 150–450)
POTASSIUM BLD-SCNC: 4.5 MMOL/L (ref 3.5–5)
PROLACTIN SERPL-MCNC: 10.5 NG/ML (ref 0–20)
PROT SERPL-MCNC: 8 G/DL (ref 6–8)
RBC # BLD AUTO: 4.41 10E6/UL (ref 3.8–5.2)
SODIUM SERPL-SCNC: 139 MMOL/L (ref 136–145)
T4 FREE SERPL-MCNC: <0.4 NG/DL (ref 0.7–1.8)
TSH SERPL DL<=0.005 MIU/L-ACNC: 44.11 UIU/ML (ref 0.3–5)
WBC # BLD AUTO: 9 10E3/UL (ref 4–11)

## 2021-10-25 PROCEDURE — 90472 IMMUNIZATION ADMIN EACH ADD: CPT | Performed by: FAMILY MEDICINE

## 2021-10-25 PROCEDURE — 90686 IIV4 VACC NO PRSV 0.5 ML IM: CPT | Performed by: FAMILY MEDICINE

## 2021-10-25 PROCEDURE — 80050 GENERAL HEALTH PANEL: CPT | Performed by: FAMILY MEDICINE

## 2021-10-25 PROCEDURE — 84146 ASSAY OF PROLACTIN: CPT | Performed by: FAMILY MEDICINE

## 2021-10-25 PROCEDURE — 90471 IMMUNIZATION ADMIN: CPT | Performed by: FAMILY MEDICINE

## 2021-10-25 PROCEDURE — 84439 ASSAY OF FREE THYROXINE: CPT | Performed by: FAMILY MEDICINE

## 2021-10-25 PROCEDURE — 90714 TD VACC NO PRESV 7 YRS+ IM: CPT | Performed by: FAMILY MEDICINE

## 2021-10-25 PROCEDURE — 99396 PREV VISIT EST AGE 40-64: CPT | Mod: 25 | Performed by: FAMILY MEDICINE

## 2021-10-25 PROCEDURE — 36415 COLL VENOUS BLD VENIPUNCTURE: CPT | Performed by: FAMILY MEDICINE

## 2021-10-25 RX ORDER — LEVOTHYROXINE SODIUM 112 UG/1
112 TABLET ORAL DAILY
Qty: 90 TABLET | Refills: 3 | Status: SHIPPED | OUTPATIENT
Start: 2021-10-25 | End: 2021-10-26

## 2021-10-25 RX ORDER — LOSARTAN POTASSIUM 25 MG/1
25 TABLET ORAL
COMMUNITY
Start: 2020-12-21 | End: 2022-02-07

## 2021-10-25 ASSESSMENT — MIFFLIN-ST. JEOR: SCORE: 1921.34

## 2021-10-25 NOTE — PROGRESS NOTES
SUBJECTIVE:   CC: Sherry Yates is an 46 year old woman who presents for preventive health visit.   No issues or complaints.  Cut out soda and losing weight.  Working on stress at work and control of anxiety.      Patient has been advised of split billing requirements and indicates understanding: Yes  Healthy Habits:   PHQ-2 Total Score: 0        Today's PHQ-2 Score:   PHQ-2 ( 1999 [employer]) 10/25/2021   Q1: Little interest or pleasure in doing things 0   Q2: Feeling down, depressed or hopeless 0   PHQ-2 Score 0       Abuse: Current or Past (Physical, Sexual or Emotional) - No  Do you feel safe in your environment? Yes        Social History     Tobacco Use     Smoking status: Never Smoker     Smokeless tobacco: Never Used     Tobacco comment: no exposure   Substance Use Topics     Alcohol use: Yes     Comment: rare     If you drink alcohol do you typically have >3 drinks per day or >7 drinks per week? No  Reviewed orders with patient.  Reviewed health maintenance and updated orders accordingly - Yes  Lab work is in process    Breast Cancer Screening:  Any new diagnosis of family breast, ovarian, or bowel cancer? No    FHS-7: No flowsheet data found.      Pertinent mammograms are reviewed under the imaging tab.    History of abnormal Pap smear:   Last 3 Pap and HPV Results:   PAP / HPV 8/1/2016   HPV See Scanned Report     PAP / HPV 8/1/2016   HPV See Scanned Report     Reviewed and updated as needed this visit by clinical staff  Tobacco  Allergies  Meds              Reviewed and updated as needed this visit by Provider              seen endocrinology in the past for prolactinoma.  Seen by GI in the past for UC        Past Medical History:   Diagnosis Date     Adenoma of pituitary (H)      Inverted nipple     Bilat and normal      PSC (primary sclerosing cholangitis)      PSC (primary sclerosing cholangitis)      Thyroid disease      Ulcerative colitis (H)      Ulcerative colitis (H)       Past Surgical History:  "  Procedure Laterality Date     ABDOMEN SURGERY       LAPAROSCOPY FOR ECTOPIC PREGNANCY N/A      RI HYSTEROSCOPY,W/ENDO BX N/A 2020    Procedure: HYSTEROSCOPY, WITH DILATION AND CURETTAGE OF UTERUS;  Surgeon: Mikki Lange MD;  Location: [facility];  Service: Gynecology     OB History    Para Term  AB Living   3 1 1 0 2 0   SAB TAB Ectopic Multiple Live Births   1 0 1 0 0      # Outcome Date GA Lbr Speedy/2nd Weight Sex Delivery Anes PTL Lv   3 SAB            2 Ectopic            1 Term                Review of Systems  CONSTITUTIONAL: NEGATIVE for fever, chills, change in weight  INTEGUMENTARU/SKIN: NEGATIVE for worrisome rashes, moles or lesions  EYES: NEGATIVE for vision changes or irritation  ENT: NEGATIVE for ear, mouth and throat problems  RESP: NEGATIVE for significant cough or SOB  BREAST: NEGATIVE for masses, tenderness or discharge  CV: NEGATIVE for chest pain, palpitations or peripheral edema  GI: NEGATIVE for nausea, abdominal pain, heartburn, or change in bowel habits  : NEGATIVE for unusual urinary or vaginal symptoms. Periods are regular.  MUSCULOSKELETAL: NEGATIVE for significant arthralgias or myalgia  NEURO: NEGATIVE for weakness, dizziness or paresthesias  PSYCHIATRIC: NEGATIVE for changes in mood or affect     OBJECTIVE:   /78 (BP Location: Right arm, Patient Position: Sitting, Cuff Size: Adult Large)   Pulse 56   Ht 1.725 m (5' 7.91\")   Wt 124.4 kg (274 lb 4.8 oz)   LMP 10/25/2021   SpO2 100%   BMI 41.81 kg/m    Physical Exam  GENERAL: healthy, alert and no distress  EYES: Eyes grossly normal to inspection, PERRL and conjunctivae and sclerae normal  HENT: ear canals and TM's normal, nose and mouth without ulcers or lesions  NECK: no adenopathy, no asymmetry, masses, or scars and thyroid normal to palpation  RESP: lungs clear to auscultation - no rales, rhonchi or wheezes  BREAST: normal without masses, tenderness or nipple discharge and no palpable " "axillary masses or adenopathy  CV: regular rate and rhythm, normal S1 S2, no S3 or S4, no murmur, click or rub, no peripheral edema and peripheral pulses strong  ABDOMEN: soft, nontender, no hepatosplenomegaly, no masses and bowel sounds normal  MS: no gross musculoskeletal defects noted, no edema  SKIN: no suspicious lesions or rashes  NEURO: Normal strength and tone, mentation intact and speech normal  PSYCH: mentation appears normal, affect normal/bright    Diagnostic Test Results:  Labs reviewed in Epic    ASSESSMENT/PLAN:       ICD-10-CM    1. Hyperprolactinemia (H)  E22.1 Prolactin     Prolactin   2. Screening for HIV (human immunodeficiency virus)  Z11.4    3. Need for hepatitis C screening test  Z11.59    4. Hypothyroidism, unspecified type  E03.9 levothyroxine (SYNTHROID/LEVOTHROID) 112 MCG tablet     TSH with free T4 reflex     TSH with free T4 reflex   5. Other ulcerative colitis without complication (H)  K51.80    6. Routine general medical examination at a health care facility  Z00.00 Comprehensive metabolic panel (BMP + Alb, Alk Phos, ALT, AST, Total. Bili, TP)     CBC with platelets and differential     Comprehensive metabolic panel (BMP + Alb, Alk Phos, ALT, AST, Total. Bili, TP)     CBC with platelets and differential   7. Morbid obesity (H)  E66.01    8. Pituitary adenoma (H)  D35.2    9. Menorrhagia with irregular cycle  N92.1        Patient has been advised of split billing requirements and indicates understanding: Yes  COUNSELING:  Reviewed preventive health counseling, as reflected in patient instructions       Regular exercise       Healthy diet/nutrition    Estimated body mass index is 41.81 kg/m  as calculated from the following:    Height as of this encounter: 1.725 m (5' 7.91\").    Weight as of this encounter: 124.4 kg (274 lb 4.8 oz).    Weight management plan: Discussed healthy diet and exercise guidelines    She reports that she has never smoked. She has never used smokeless " tobacco.      Counseling Resources:  ATP IV Guidelines  Pooled Cohorts Equation Calculator  Breast Cancer Risk Calculator  BRCA-Related Cancer Risk Assessment: FHS-7 Tool  FRAX Risk Assessment  ICSI Preventive Guidelines  Dietary Guidelines for Americans, 2010  USDA's MyPlate  ASA Prophylaxis  Lung CA Screening    Kimberly Montano MD  [facility]

## 2021-10-25 NOTE — LETTER
October 26, 2021      Sherry Yates  [address] [address]        Dear ,    We are writing to inform you of your test results.    Here are your recent results which are stable.  Your prolactin is normal.  Your Thyroid however is off and is low - I will call in a higher dose of medication       Resulted Orders   TSH with free T4 reflex   Result Value Ref Range    TSH 44.11 (H) 0.30 - 5.00 uIU/mL   Comprehensive metabolic panel (BMP + Alb, Alk Phos, ALT, AST, Total. Bili, TP)   Result Value Ref Range    Sodium 139 136 - 145 mmol/L    Potassium 4.5 3.5 - 5.0 mmol/L    Chloride 103 98 - 107 mmol/L    Carbon Dioxide (CO2) 25 22 - 31 mmol/L    Anion Gap 11 5 - 18 mmol/L    Urea Nitrogen 17 8 - 22 mg/dL    Creatinine 0.84 0.60 - 1.10 mg/dL    Calcium 10.0 8.5 - 10.5 mg/dL    Glucose 74 70 - 125 mg/dL    Alkaline Phosphatase 206 (H) 45 - 120 U/L    AST 40 0 - 40 U/L    ALT 35 0 - 45 U/L    Protein Total 8.0 6.0 - 8.0 g/dL    Albumin 4.0 3.5 - 5.0 g/dL    Bilirubin Total 1.1 (H) 0.0 - 1.0 mg/dL    GFR Estimate 82 >60 mL/min/1.73m2      Comment:      As of July 11, 2021, eGFR is calculated by the CKD-EPI creatinine equation, without race adjustment. eGFR can be influenced by muscle mass, exercise, and diet. The reported eGFR is an estimation only and is only applicable if the renal function is stable.   Prolactin   Result Value Ref Range    Prolactin 10.5 0.0 - 20.0 ng/mL   CBC with platelets and differential   Result Value Ref Range    WBC Count 9.0 4.0 - 11.0 10e3/uL    RBC Count 4.41 3.80 - 5.20 10e6/uL    Hemoglobin 13.1 11.7 - 15.7 g/dL    Hematocrit 41.9 35.0 - 47.0 %    MCV 95 78 - 100 fL    MCH 29.7 26.5 - 33.0 pg    MCHC 31.3 (L) 31.5 - 36.5 g/dL    RDW 13.2 10.0 - 15.0 %    Platelet Count 233 150 - 450 10e3/uL    % Neutrophils 71 %    % Lymphocytes 22 %    % Monocytes 5 %    % Eosinophils 2 %    % Basophils 1 %    % Immature Granulocytes 0 %    Absolute Neutrophils 6.4 1.6 - 8.3 10e3/uL     Absolute Lymphocytes 2.0 0.8 - 5.3 10e3/uL    Absolute Monocytes 0.4 0.0 - 1.3 10e3/uL    Absolute Eosinophils 0.1 0.0 - 0.7 10e3/uL    Absolute Basophils 0.1 0.0 - 0.2 10e3/uL    Absolute Immature Granulocytes 0.0 <=0.0 10e3/uL   T4 free   Result Value Ref Range    Free T4 <0.40 (L) 0.70 - 1.80 ng/dL      Comment:      Performance of the Free T4 test has not been established with  specimens (<= 2 months of age).         If you have any questions or concerns, please call the clinic at the number listed above.       Sincerely,      Kimberly Montano MD

## 2021-10-26 DIAGNOSIS — E03.9 HYPOTHYROIDISM, UNSPECIFIED TYPE: ICD-10-CM

## 2021-10-26 RX ORDER — LEVOTHYROXINE SODIUM 137 UG/1
137 TABLET ORAL DAILY
Qty: 90 TABLET | Refills: 1 | Status: SHIPPED | OUTPATIENT
Start: 2021-10-26 | End: 2022-05-20

## 2021-11-24 ENCOUNTER — IMMUNIZATION (OUTPATIENT)
Dept: NURSING | Facility: CLINIC | Age: 46
End: 2021-11-24
Payer: COMMERCIAL

## 2021-11-24 PROCEDURE — 91306 COVID-19,PF,MODERNA (18+ YRS BOOSTER .25ML): CPT

## 2021-11-24 PROCEDURE — 0064A COVID-19,PF,MODERNA (18+ YRS BOOSTER .25ML): CPT

## 2021-12-14 NOTE — PROGRESS NOTES
Assessment & Plan     Screening for malignant neoplasm of cervix  - Pap Screen with HPV - recommended age 30 - 65 years    Routine general medical examination at a health care facility  Repeat labs - reviewed previous labs  - Comprehensive metabolic panel (BMP + Alb, Alk Phos, ALT, AST, Total. Bili, TP)  - Lipid panel reflex to direct LDL Fasting  - Comprehensive metabolic panel (BMP + Alb, Alk Phos, ALT, AST, Total. Bili, TP)  - Lipid panel reflex to direct LDL Fasting    Vitamin D deficiency  - Vitamin D Deficiency  - Vitamin D Deficiency    Hypothyroidism, unspecified type  - TSH with free T4 reflex  - TSH with free T4 reflex           Return in about 1 year (around 12/15/2022) for Routine preventive.    Kimberly Montano MD  [facility] KARYMOOKIE Powell is a 46 year old who presents for the following health issues     HPI     Patient is here for a pap only. Patient has some questions about her last set of labs      Review of Systems   Constitutional, HEENT, cardiovascular, pulmonary, gi and gu systems are negative, except as otherwise noted.      Objective    /78 (BP Location: Right arm, Patient Position: Sitting, Cuff Size: Adult Large)   Wt 123.4 kg (272 lb)   BMI 41.46 kg/m    Body mass index is 41.46 kg/m .  Physical Exam   GENERAL: healthy, alert and no distress   (female): normal female external genitalia, normal urethral meatus, vaginal mucosa pink, moist, well rugated, and normal cervix/adnexa/uterus without masses or discharge  MS: no gross musculoskeletal defects noted, no edema  SKIN: no suspicious lesions or rashes  NEURO: Normal strength and tone, mentation intact and speech normal  PSYCH: mentation appears normal, affect normal/bright

## 2021-12-15 ENCOUNTER — OFFICE VISIT (OUTPATIENT)
Dept: FAMILY MEDICINE | Facility: CLINIC | Age: 46
End: 2021-12-15
Payer: COMMERCIAL

## 2021-12-15 VITALS — BODY MASS INDEX: 41.46 KG/M2 | WEIGHT: 272 LBS | DIASTOLIC BLOOD PRESSURE: 78 MMHG | SYSTOLIC BLOOD PRESSURE: 110 MMHG

## 2021-12-15 DIAGNOSIS — E55.9 VITAMIN D DEFICIENCY: ICD-10-CM

## 2021-12-15 DIAGNOSIS — Z12.4 SCREENING FOR MALIGNANT NEOPLASM OF CERVIX: Primary | ICD-10-CM

## 2021-12-15 DIAGNOSIS — E03.9 HYPOTHYROIDISM, UNSPECIFIED TYPE: ICD-10-CM

## 2021-12-15 DIAGNOSIS — Z00.00 ROUTINE GENERAL MEDICAL EXAMINATION AT A HEALTH CARE FACILITY: ICD-10-CM

## 2021-12-15 LAB
ALBUMIN SERPL-MCNC: 3.7 G/DL (ref 3.5–5)
ALP SERPL-CCNC: 181 U/L (ref 45–120)
ALT SERPL W P-5'-P-CCNC: 31 U/L (ref 0–45)
ANION GAP SERPL CALCULATED.3IONS-SCNC: 10 MMOL/L (ref 5–18)
AST SERPL W P-5'-P-CCNC: 30 U/L (ref 0–40)
BILIRUB SERPL-MCNC: 1.5 MG/DL (ref 0–1)
BUN SERPL-MCNC: 18 MG/DL (ref 8–22)
CALCIUM SERPL-MCNC: 9.4 MG/DL (ref 8.5–10.5)
CHLORIDE BLD-SCNC: 103 MMOL/L (ref 98–107)
CHOLEST SERPL-MCNC: 169 MG/DL
CO2 SERPL-SCNC: 25 MMOL/L (ref 22–31)
CREAT SERPL-MCNC: 0.68 MG/DL (ref 0.6–1.1)
FASTING STATUS PATIENT QL REPORTED: YES
GFR SERPL CREATININE-BSD FRML MDRD: >90 ML/MIN/1.73M2
GLUCOSE BLD-MCNC: 88 MG/DL (ref 70–125)
HDLC SERPL-MCNC: 53 MG/DL
LDLC SERPL CALC-MCNC: 96 MG/DL
POTASSIUM BLD-SCNC: 4.2 MMOL/L (ref 3.5–5)
PROT SERPL-MCNC: 7.6 G/DL (ref 6–8)
SODIUM SERPL-SCNC: 138 MMOL/L (ref 136–145)
TRIGL SERPL-MCNC: 100 MG/DL
TSH SERPL DL<=0.005 MIU/L-ACNC: 0.59 UIU/ML (ref 0.3–5)

## 2021-12-15 PROCEDURE — 80053 COMPREHEN METABOLIC PANEL: CPT | Performed by: FAMILY MEDICINE

## 2021-12-15 PROCEDURE — 82306 VITAMIN D 25 HYDROXY: CPT | Performed by: FAMILY MEDICINE

## 2021-12-15 PROCEDURE — G0123 SCREEN CERV/VAG THIN LAYER: HCPCS | Performed by: FAMILY MEDICINE

## 2021-12-15 PROCEDURE — 80061 LIPID PANEL: CPT | Performed by: FAMILY MEDICINE

## 2021-12-15 PROCEDURE — 99213 OFFICE O/P EST LOW 20 MIN: CPT | Performed by: FAMILY MEDICINE

## 2021-12-15 PROCEDURE — 87624 HPV HI-RISK TYP POOLED RSLT: CPT | Performed by: FAMILY MEDICINE

## 2021-12-15 PROCEDURE — 84443 ASSAY THYROID STIM HORMONE: CPT | Performed by: FAMILY MEDICINE

## 2021-12-15 PROCEDURE — 36415 COLL VENOUS BLD VENIPUNCTURE: CPT | Performed by: FAMILY MEDICINE

## 2021-12-15 NOTE — LETTER
January 5, 2022      Sherry Yates  [address] [address]        Dear ,    We are writing to inform you of your test results.    Here are your recent results which are within the expected range. Please continue with your current plan of care and let us know if you have any questions or concerns      Resulted Orders   TSH with free T4 reflex   Result Value Ref Range    TSH 0.59 0.30 - 5.00 uIU/mL   Comprehensive metabolic panel (BMP + Alb, Alk Phos, ALT, AST, Total. Bili, TP)   Result Value Ref Range    Sodium 138 136 - 145 mmol/L    Potassium 4.2 3.5 - 5.0 mmol/L    Chloride 103 98 - 107 mmol/L    Carbon Dioxide (CO2) 25 22 - 31 mmol/L    Anion Gap 10 5 - 18 mmol/L    Urea Nitrogen 18 8 - 22 mg/dL    Creatinine 0.68 0.60 - 1.10 mg/dL    Calcium 9.4 8.5 - 10.5 mg/dL    Glucose 88 70 - 125 mg/dL    Alkaline Phosphatase 181 (H) 45 - 120 U/L    AST 30 0 - 40 U/L    ALT 31 0 - 45 U/L    Protein Total 7.6 6.0 - 8.0 g/dL    Albumin 3.7 3.5 - 5.0 g/dL    Bilirubin Total 1.5 (H) 0.0 - 1.0 mg/dL    GFR Estimate >90 >60 mL/min/1.73m2      Comment:      As of July 11, 2021, eGFR is calculated by the CKD-EPI creatinine equation, without race adjustment. eGFR can be influenced by muscle mass, exercise, and diet. The reported eGFR is an estimation only and is only applicable if the renal function is stable.   Lipid panel reflex to direct LDL Fasting   Result Value Ref Range    Cholesterol 169 <=199 mg/dL    Triglycerides 100 <=149 mg/dL    Direct Measure HDL 53 >=50 mg/dL      Comment:      HDL Cholesterol Reference Range:     0-2 years:   No reference ranges established for patients under 2 years old  at [facility] for lipid analytes.    2-8 years:  Greater than 45 mg/dL     18 years and older:   Female: Greater than or equal to 50 mg/dL   Male:   Greater than or equal to 40 mg/dL    LDL Cholesterol Calculated 96 <=129 mg/dL    Patient Fasting > 8hrs? Yes    Vitamin D Deficiency   Result  Value Ref Range    Vitamin D, Total (25-Hydroxy) 40 30 - 80 ug/L    Narrative    Deficiency <10.0 ug/L  Insufficiency 10.0-29.9 ug/L  Sufficiency 30.0-80.0 ug/L  Toxicity (possible) >100.0 ug/L        If you have any questions or concerns, please call the clinic at the number listed above.       Sincerely,      Kimberly Montano MD

## 2021-12-16 LAB — DEPRECATED CALCIDIOL+CALCIFEROL SERPL-MC: 40 UG/L (ref 30–80)

## 2021-12-17 LAB
HUMAN PAPILLOMA VIRUS 16 DNA: NEGATIVE
HUMAN PAPILLOMA VIRUS 18 DNA: NEGATIVE
HUMAN PAPILLOMA VIRUS FINAL DIAGNOSIS: NORMAL
HUMAN PAPILLOMA VIRUS OTHER HR: NEGATIVE

## 2021-12-22 LAB
BKR LAB AP GYN ADEQUACY: NORMAL
BKR LAB AP GYN INTERPRETATION: NORMAL
BKR LAB AP HPV REFLEX: NORMAL
BKR LAB AP PREVIOUS ABNORMAL: NORMAL
PATH REPORT.COMMENTS IMP SPEC: NORMAL
PATH REPORT.COMMENTS IMP SPEC: NORMAL
PATH REPORT.RELEVANT HX SPEC: NORMAL

## 2022-01-31 ENCOUNTER — TRANSFERRED RECORDS (OUTPATIENT)
Dept: HEALTH INFORMATION MANAGEMENT | Facility: CLINIC | Age: 47
End: 2022-01-31
Payer: COMMERCIAL

## 2022-02-07 DIAGNOSIS — I10 ESSENTIAL HYPERTENSION, BENIGN: Primary | ICD-10-CM

## 2022-02-07 NOTE — TELEPHONE ENCOUNTER
Pending Prescriptions:                       Disp   Refills    losartan (COZAAR) 25 MG tablet                                Sig: Take 1 tablet (25 mg) by mouth

## 2022-02-08 RX ORDER — LOSARTAN POTASSIUM 25 MG/1
25 TABLET ORAL DAILY
Qty: 90 TABLET | Refills: 3 | Status: SHIPPED | OUTPATIENT
Start: 2022-02-08 | End: 2022-11-23

## 2022-02-08 NOTE — TELEPHONE ENCOUNTER
"losartan (COZAAR) 25 MG tablet 90 tablet 3 12/21/2020  --   Sig - Route: Take 1 tablet (25 mg total) by mouth daily. - Oral   Sent to pharmacy as: losartan 25 mg tablet (COZAAR)   E-Prescribing Status: Receipt confirmed by pharmacy (12/21/2020  4:15 PM CST)     Last Written Prescription Date:  12/21/2020  Last Fill Quantity: 90,  # refills: 3   Last office visit provider:  12/15/2021     Requested Prescriptions   Pending Prescriptions Disp Refills     losartan (COZAAR) 25 MG tablet       Sig: Take 1 tablet (25 mg) by mouth       Angiotensin-II Receptors Passed - 2/7/2022  8:21 AM        Passed - Last blood pressure under 140/90 in past 12 months     BP Readings from Last 3 Encounters:   12/15/21 110/78   10/25/21 124/78   06/23/21 (!) 124/92                 Passed - Recent (12 mo) or future (30 days) visit within the authorizing provider's specialty     Patient has had an office visit with the authorizing provider or a provider within the authorizing providers department within the previous 12 mos or has a future within next 30 days. See \"Patient Info\" tab in inbasket, or \"Choose Columns\" in Meds & Orders section of the refill encounter.              Passed - Medication is active on med list        Passed - Patient is age 18 or older        Passed - No active pregnancy on record        Passed - Normal serum creatinine on file in past 12 months     Recent Labs   Lab Test 12/15/21  0921   CR 0.68       Ok to refill medication if creatinine is low          Passed - Normal serum potassium on file in past 12 months     Recent Labs   Lab Test 12/15/21  0921   POTASSIUM 4.2                    Passed - No positive pregnancy test in past 12 months             Nalini Santiago RN 02/08/22 3:07 PM  "

## 2022-03-04 ENCOUNTER — TRANSFERRED RECORDS (OUTPATIENT)
Dept: HEALTH INFORMATION MANAGEMENT | Facility: CLINIC | Age: 47
End: 2022-03-04
Payer: COMMERCIAL

## 2022-05-19 DIAGNOSIS — K51.90 UC (ULCERATIVE COLITIS) (H): Primary | ICD-10-CM

## 2022-05-19 DIAGNOSIS — E03.9 HYPOTHYROIDISM, UNSPECIFIED TYPE: ICD-10-CM

## 2022-05-20 RX ORDER — LEVOTHYROXINE SODIUM 137 UG/1
137 TABLET ORAL DAILY
Qty: 90 TABLET | Refills: 2 | Status: SHIPPED | OUTPATIENT
Start: 2022-05-20 | End: 2022-11-23

## 2022-05-20 NOTE — TELEPHONE ENCOUNTER
"Last Written Prescription Date:  10/26/21  Last Fill Quantity: 90,  # refills: 1   Last office visit provider:  12/15/21     Requested Prescriptions   Pending Prescriptions Disp Refills     mesalamine (ASACOL HD) 800 MG EC tablet 60 tablet 0     Sig: TK 1 T PO TID       Aminosalicylate Agents Passed - 5/20/2022  1:24 PM        Passed - Normal Serum ALT on file within past 12 months     Recent Labs   Lab Test 12/15/21  0921   ALT 31             Passed - Recent (12 mo) or future (30 days) visit within the authorizing provider's specialty     Patient has had an office visit with the authorizing provider or a provider within the authorizing providers department within the previous 12 mos or has a future within next 30 days. See \"Patient Info\" tab in inbasket, or \"Choose Columns\" in Meds & Orders section of the refill encounter.              Passed - Patient is 5 years of age or older        Passed - Normal CBC on file within past 12 months     Recent Labs   Lab Test 10/25/21  0943   WBC 9.0   RBC 4.41   HGB 13.1   HCT 41.9                    Passed - Medication is active on med list        Passed - No active pregnancy on record        Passed - Normal Serum Creatinine on file within past 12 months     Recent Labs   Lab Test 12/15/21  0921   CR 0.68       Ok to refill medication if creatinine is low          Passed - No positive pregnancy test within past 12 months           levothyroxine (SYNTHROID/LEVOTHROID) 137 MCG tablet 90 tablet 1     Sig: Take 1 tablet (137 mcg) by mouth daily       Thyroid Protocol Passed - 5/20/2022  1:24 PM        Passed - Patient is 12 years or older        Passed - Recent (12 mo) or future (30 days) visit within the authorizing provider's specialty     Patient has had an office visit with the authorizing provider or a provider within the authorizing providers department within the previous 12 mos or has a future within next 30 days. See \"Patient Info\" tab in inbasket, or \"Choose " "Columns\" in Meds & Orders section of the refill encounter.              Passed - Medication is active on med list        Passed - Normal TSH on file in past 12 months     Recent Labs   Lab Test 12/15/21  0921   TSH 0.59              Passed - No active pregnancy on record     If patient is pregnant or has had a positive pregnancy test, please check TSH.          Passed - No positive pregnancy test in past 12 months     If patient is pregnant or has had a positive pregnancy test, please check TSH.               Matt Beach RN 05/20/22 1:24 PM  "

## 2022-05-20 NOTE — TELEPHONE ENCOUNTER
"Outpatient Medication Detail     Disp Refills Start End HEIDI   mesalamine (ASACOL HD) 800 mg TbEC EC tablet 180 tablet 1 5/19/2021  No   Sig - Route: Take 1 tablet (800 mg total) by mouth 3 (three) times a day. - Oral   Sent to pharmacy as: mesalamine 800 mg tablet,delayed release (ASACOL HD)   E-Prescribing Status: Receipt confirmed by pharmacy (5/19/2021  3:43 PM CDT)       mesalamine (ASACOL HD) 800 mg TbEC EC tablet [[id number]]    Electronically signed by: Matt Beach, RN on 05/19/21 1543 Status: Active   Ordering user: Matt Beach RN 05/19/21 1546 Ordering provider: Kimberly Montano MD   Authorized by: Kimberly Montano MD   Frequency: TID 05/19/21 - Until Discontinued Released by: Matt Beach RN 05/19/21 1543   Diagnoses  UC (ulcerative colitis) (H) [K51.90]     Routing refill request to provider for review/approval because:  A break in medication  Due to medication information not transferring due to [facility] please review the medication information prior to signing to ensure accuracy.    Last office visit provider:  12/15/21     Requested Prescriptions   Pending Prescriptions Disp Refills     mesalamine (ASACOL HD) 800 MG EC tablet 270 tablet 2     Sig: Take 1 tablet (800 mg total) by mouth 3 (three) times a day       Aminosalicylate Agents Passed - 5/20/2022  1:24 PM        Passed - Normal Serum ALT on file within past 12 months     Recent Labs   Lab Test 12/15/21  0921   ALT 31             Passed - Recent (12 mo) or future (30 days) visit within the authorizing provider's specialty     Patient has had an office visit with the authorizing provider or a provider within the authorizing providers department within the previous 12 mos or has a future within next 30 days. See \"Patient Info\" tab in inbasket, or \"Choose Columns\" in Meds & Orders section of the refill encounter.              Passed - Patient is 5 years of age or older        Passed - Normal CBC on file within past 12 months     Recent Labs   Lab " "Test 10/25/21  0943   WBC 9.0   RBC 4.41   HGB 13.1   HCT 41.9                    Passed - Medication is active on med list        Passed - No active pregnancy on record        Passed - Normal Serum Creatinine on file within past 12 months     Recent Labs   Lab Test 12/15/21  0921   CR 0.68       Ok to refill medication if creatinine is low          Passed - No positive pregnancy test within past 12 months         Signed Prescriptions Disp Refills    levothyroxine (SYNTHROID/LEVOTHROID) 137 MCG tablet 90 tablet 2     Sig: Take 1 tablet (137 mcg) by mouth daily       Thyroid Protocol Passed - 5/20/2022  1:24 PM        Passed - Patient is 12 years or older        Passed - Recent (12 mo) or future (30 days) visit within the authorizing provider's specialty     Patient has had an office visit with the authorizing provider or a provider within the authorizing providers department within the previous 12 mos or has a future within next 30 days. See \"Patient Info\" tab in inbasket, or \"Choose Columns\" in Meds & Orders section of the refill encounter.              Passed - Medication is active on med list        Passed - Normal TSH on file in past 12 months     Recent Labs   Lab Test 12/15/21  0921   TSH 0.59              Passed - No active pregnancy on record     If patient is pregnant or has had a positive pregnancy test, please check TSH.          Passed - No positive pregnancy test in past 12 months     If patient is pregnant or has had a positive pregnancy test, please check TSH.               Matt Beach RN 05/20/22 1:26 PM  "

## 2022-05-23 RX ORDER — MESALAMINE 800 MG/1
TABLET, DELAYED RELEASE ORAL
Qty: 270 TABLET | Refills: 2 | Status: SHIPPED | OUTPATIENT
Start: 2022-05-23 | End: 2023-12-28

## 2022-09-18 ENCOUNTER — HEALTH MAINTENANCE LETTER (OUTPATIENT)
Age: 47
End: 2022-09-18

## 2022-11-01 ENCOUNTER — TRANSFERRED RECORDS (OUTPATIENT)
Dept: HEALTH INFORMATION MANAGEMENT | Facility: CLINIC | Age: 47
End: 2022-11-01

## 2022-11-22 ASSESSMENT — ENCOUNTER SYMPTOMS
PALPITATIONS: 0
CHILLS: 0
EYE PAIN: 0
SHORTNESS OF BREATH: 0
COUGH: 0
ABDOMINAL PAIN: 0
DIZZINESS: 0
WEAKNESS: 0
NAUSEA: 0
HEADACHES: 0
SORE THROAT: 0
ARTHRALGIAS: 0
DIARRHEA: 0
MYALGIAS: 0
FEVER: 0
FREQUENCY: 0
JOINT SWELLING: 0
NERVOUS/ANXIOUS: 0
HEMATOCHEZIA: 0
HEMATURIA: 0
PARESTHESIAS: 0
DYSURIA: 0
CONSTIPATION: 0
HEARTBURN: 0
BREAST MASS: 0

## 2022-11-22 NOTE — PROGRESS NOTES
SUBJECTIVE:   CC: Sherry is an 47 year old who presents for preventive health visit.     Patient has been advised of split billing requirements and indicates understanding: No  Healthy Habits:     Getting at least 3 servings of Calcium per day:  Yes    Bi-annual eye exam:  NO    Dental care twice a year:  Yes    Sleep apnea or symptoms of sleep apnea:  None    Diet:  Regular (no restrictions)    Frequency of exercise:  2-3 days/week    Duration of exercise:  Less than 15 minutes    Taking medications regularly:  Yes    Medication side effects:  None    PHQ-2 Total Score: 1          Today's PHQ-2 Score:   PHQ-2 ( 1999 Pf[employer]) 11/22/2022   Q1: Little interest or pleasure in doing things 0   Q2: Feeling down, depressed or hopeless 1   PHQ-2 Score 1   PHQ-2 Total Score (12-17 Years)- Positive if 3 or more points; Administer PHQ-A if positive -   Q1: Little interest or pleasure in doing things Not at all   Q2: Feeling down, depressed or hopeless Several days   PHQ-2 Score 1       Difficulty with     Social History     Tobacco Use     Smoking status: Never     Smokeless tobacco: Never     Tobacco comments:     no exposure   Substance Use Topics     Alcohol use: Yes     Comment: rare         Alcohol Use 11/22/2022   Prescreen: >3 drinks/day or >7 drinks/week? No   Prescreen: >3 drinks/day or >7 drinks/week? -       Reviewed orders with patient.  Reviewed health maintenance and updated orders accordingly - Yes  Lab work is in process    Breast Cancer Screening:  Any new diagnosis of family breast, ovarian, or bowel cancer? No    FHS-7: No flowsheet data found.    Mammogram Screening: Recommended annual mammography  Pertinent mammograms are reviewed under the imaging tab.    History of abnormal Pap smear: Last 3 Pap Results: No results found for: PAP  PAP / HPV Latest Ref Rng & Units 12/15/2021 8/1/2016   PAP   Negative for Intraepithelial Lesion or Malignancy (NILM) -   HPV16 Negative Negative -   HPV18 Negative Negative  "-   HPV - - See Scanned Report   HRHPV Negative Negative -     Reviewed and updated as needed this visit by clinical staff   Tobacco  Allergies  Meds              Reviewed and updated as needed this visit by Provider                     Review of Systems   Constitutional: Negative for chills and fever.   HENT: Negative for congestion, ear pain, hearing loss and sore throat.    Eyes: Negative for pain and visual disturbance.   Respiratory: Negative for cough and shortness of breath.    Cardiovascular: Negative for chest pain, palpitations and peripheral edema.   Gastrointestinal: Negative for abdominal pain, constipation, diarrhea, heartburn, hematochezia and nausea.   Breasts:  Negative for tenderness, breast mass and discharge.   Genitourinary: Negative for dysuria, frequency, genital sores, hematuria, pelvic pain, urgency, vaginal bleeding and vaginal discharge.   Musculoskeletal: Negative for arthralgias, joint swelling and myalgias.   Skin: Negative for rash.   Neurological: Negative for dizziness, weakness, headaches and paresthesias.   Psychiatric/Behavioral: Negative for mood changes. The patient is not nervous/anxious.           OBJECTIVE:   /78 (BP Location: Right arm, Patient Position: Sitting, Cuff Size: Adult Large)   Pulse 88   Resp 18   Ht 1.734 m (5' 8.25\")   Wt 128.6 kg (283 lb 9.6 oz)   BMI 42.81 kg/m    Physical Exam  GENERAL: healthy, alert and no distress  NECK: no adenopathy, no asymmetry, masses, or scars and thyroid normal to palpation  RESP: lungs clear to auscultation - no rales, rhonchi or wheezes  CV: regular rate and rhythm, normal S1 S2, no S3 or S4, no murmur, click or rub, no peripheral edema and peripheral pulses strong  ABDOMEN: soft, nontender, no hepatosplenomegaly, no masses and bowel sounds normal  MS: no gross musculoskeletal defects noted, no edema        ASSESSMENT/PLAN:       ICD-10-CM    1. Routine general medical examination at a health care facility  Z00.00 " Comprehensive metabolic panel (BMP + Alb, Alk Phos, ALT, AST, Total. Bili, TP)     CBC with platelets and differential     Lipid panel reflex to direct LDL Fasting     Comprehensive metabolic panel (BMP + Alb, Alk Phos, ALT, AST, Total. Bili, TP)     CBC with platelets and differential     Lipid panel reflex to direct LDL Fasting      2. Screen for colon cancer  Z12.11 Adult GI  Referral - Procedure Only     Adult GI  Referral - Consult Only     CANCELED: Adult GI  Referral - Consult Only      3. Visit for screening mammogram  Z12.31 MA SCREENING DIGITAL BILAT - Future  (s+30)      4. Hypothyroidism, unspecified type  E03.9 levothyroxine (SYNTHROID/LEVOTHROID) 137 MCG tablet     TSH with free T4 reflex     TSH with free T4 reflex      5. Essential hypertension, benign  I10 losartan (COZAAR) 25 MG tablet      6. Ulcerative colitis without complications, unspecified location (H)  K51.90 Adult GI  Referral - Consult Only     CANCELED: Adult GI  Referral - Consult Only      7. Elevated liver enzymes  R74.8 Adult GI  Referral - Consult Only   Seen by [facility] in the past and will follow up with them  CANCELED: Adult GI  Referral - Consult Only      8. Anxiety - will do referral to therapy and call if any questions or concerns F41.9 Adult Mental Health  Referral      9. Morbid obesity (H)  E66.01       10. Hyperprolactinemia (H)  E22.1 Stable.  Will continue to monitor.  seing endocrine      11. Pituitary adenoma (H)  D35.2 Stable  Seeing endocrine - no longer on medication management          Patient has been advised of split billing requirements and indicates understanding: Yes      COUNSELING:  Reviewed preventive health counseling, as reflected in patient instructions       Regular exercise       Healthy diet/nutrition        She reports that she has never smoked. She has never used smokeless tobacco.          Kimberly Montano MD  [facility]  [facility]

## 2022-11-23 ENCOUNTER — OFFICE VISIT (OUTPATIENT)
Dept: FAMILY MEDICINE | Facility: CLINIC | Age: 47
End: 2022-11-23
Payer: COMMERCIAL

## 2022-11-23 VITALS
WEIGHT: 283.6 LBS | HEIGHT: 68 IN | RESPIRATION RATE: 18 BRPM | HEART RATE: 88 BPM | DIASTOLIC BLOOD PRESSURE: 78 MMHG | BODY MASS INDEX: 42.98 KG/M2 | SYSTOLIC BLOOD PRESSURE: 124 MMHG

## 2022-11-23 DIAGNOSIS — Z12.11 SCREEN FOR COLON CANCER: ICD-10-CM

## 2022-11-23 DIAGNOSIS — R74.8 ELEVATED LIVER ENZYMES: ICD-10-CM

## 2022-11-23 DIAGNOSIS — I10 ESSENTIAL HYPERTENSION, BENIGN: ICD-10-CM

## 2022-11-23 DIAGNOSIS — Z12.31 VISIT FOR SCREENING MAMMOGRAM: ICD-10-CM

## 2022-11-23 DIAGNOSIS — F41.9 ANXIETY: ICD-10-CM

## 2022-11-23 DIAGNOSIS — E22.1 HYPERPROLACTINEMIA (H): ICD-10-CM

## 2022-11-23 DIAGNOSIS — E03.9 HYPOTHYROIDISM, UNSPECIFIED TYPE: ICD-10-CM

## 2022-11-23 DIAGNOSIS — E66.01 MORBID OBESITY (H): ICD-10-CM

## 2022-11-23 DIAGNOSIS — Z00.00 ROUTINE GENERAL MEDICAL EXAMINATION AT A HEALTH CARE FACILITY: Primary | ICD-10-CM

## 2022-11-23 DIAGNOSIS — K51.90 ULCERATIVE COLITIS WITHOUT COMPLICATIONS, UNSPECIFIED LOCATION (H): ICD-10-CM

## 2022-11-23 DIAGNOSIS — D35.2 PITUITARY ADENOMA (H): ICD-10-CM

## 2022-11-23 LAB
ALBUMIN SERPL BCG-MCNC: 4.2 G/DL (ref 3.5–5.2)
ALP SERPL-CCNC: 141 U/L (ref 35–104)
ALT SERPL W P-5'-P-CCNC: 29 U/L (ref 10–35)
ANION GAP SERPL CALCULATED.3IONS-SCNC: 11 MMOL/L (ref 7–15)
AST SERPL W P-5'-P-CCNC: 30 U/L (ref 10–35)
BASOPHILS # BLD AUTO: 0 10E3/UL (ref 0–0.2)
BASOPHILS NFR BLD AUTO: 0 %
BILIRUB SERPL-MCNC: 0.9 MG/DL
BUN SERPL-MCNC: 15.7 MG/DL (ref 6–20)
CALCIUM SERPL-MCNC: 9.6 MG/DL (ref 8.6–10)
CHLORIDE SERPL-SCNC: 102 MMOL/L (ref 98–107)
CHOLEST SERPL-MCNC: 181 MG/DL
CREAT SERPL-MCNC: 0.58 MG/DL (ref 0.51–0.95)
DEPRECATED HCO3 PLAS-SCNC: 24 MMOL/L (ref 22–29)
EOSINOPHIL # BLD AUTO: 0.2 10E3/UL (ref 0–0.7)
EOSINOPHIL NFR BLD AUTO: 2 %
ERYTHROCYTE [DISTWIDTH] IN BLOOD BY AUTOMATED COUNT: 13.3 % (ref 10–15)
GFR SERPL CREATININE-BSD FRML MDRD: >90 ML/MIN/1.73M2
GLUCOSE SERPL-MCNC: 89 MG/DL (ref 70–99)
HCT VFR BLD AUTO: 39.5 % (ref 35–47)
HDLC SERPL-MCNC: 60 MG/DL
HGB BLD-MCNC: 12.8 G/DL (ref 11.7–15.7)
IMM GRANULOCYTES # BLD: 0 10E3/UL
IMM GRANULOCYTES NFR BLD: 0 %
LDLC SERPL CALC-MCNC: 90 MG/DL
LYMPHOCYTES # BLD AUTO: 2.7 10E3/UL (ref 0.8–5.3)
LYMPHOCYTES NFR BLD AUTO: 26 %
MCH RBC QN AUTO: 28.3 PG (ref 26.5–33)
MCHC RBC AUTO-ENTMCNC: 32.4 G/DL (ref 31.5–36.5)
MCV RBC AUTO: 87 FL (ref 78–100)
MONOCYTES # BLD AUTO: 0.7 10E3/UL (ref 0–1.3)
MONOCYTES NFR BLD AUTO: 7 %
NEUTROPHILS # BLD AUTO: 6.6 10E3/UL (ref 1.6–8.3)
NEUTROPHILS NFR BLD AUTO: 65 %
NONHDLC SERPL-MCNC: 121 MG/DL
PLATELET # BLD AUTO: 244 10E3/UL (ref 150–450)
POTASSIUM SERPL-SCNC: 4.1 MMOL/L (ref 3.4–5.3)
PROT SERPL-MCNC: 7.9 G/DL (ref 6.4–8.3)
RBC # BLD AUTO: 4.53 10E6/UL (ref 3.8–5.2)
SODIUM SERPL-SCNC: 137 MMOL/L (ref 136–145)
T4 FREE SERPL-MCNC: 1.44 NG/DL (ref 0.9–1.7)
TRIGL SERPL-MCNC: 155 MG/DL
TSH SERPL DL<=0.005 MIU/L-ACNC: 0.09 UIU/ML (ref 0.3–4.2)
WBC # BLD AUTO: 10.1 10E3/UL (ref 4–11)

## 2022-11-23 PROCEDURE — 84439 ASSAY OF FREE THYROXINE: CPT | Performed by: FAMILY MEDICINE

## 2022-11-23 PROCEDURE — 90472 IMMUNIZATION ADMIN EACH ADD: CPT | Performed by: FAMILY MEDICINE

## 2022-11-23 PROCEDURE — 80050 GENERAL HEALTH PANEL: CPT | Performed by: FAMILY MEDICINE

## 2022-11-23 PROCEDURE — 80061 LIPID PANEL: CPT | Performed by: FAMILY MEDICINE

## 2022-11-23 PROCEDURE — 90471 IMMUNIZATION ADMIN: CPT | Performed by: FAMILY MEDICINE

## 2022-11-23 PROCEDURE — 36415 COLL VENOUS BLD VENIPUNCTURE: CPT | Performed by: FAMILY MEDICINE

## 2022-11-23 PROCEDURE — 99396 PREV VISIT EST AGE 40-64: CPT | Mod: 25 | Performed by: FAMILY MEDICINE

## 2022-11-23 PROCEDURE — 90686 IIV4 VACC NO PRSV 0.5 ML IM: CPT | Performed by: FAMILY MEDICINE

## 2022-11-23 PROCEDURE — 90677 PCV20 VACCINE IM: CPT | Performed by: FAMILY MEDICINE

## 2022-11-23 PROCEDURE — 99213 OFFICE O/P EST LOW 20 MIN: CPT | Mod: 25 | Performed by: FAMILY MEDICINE

## 2022-11-23 PROCEDURE — 91313 COVID-19 VACCINE BIVALENT BOOSTER 18+ (MODERNA): CPT | Performed by: FAMILY MEDICINE

## 2022-11-23 PROCEDURE — 0134A COVID-19 VACCINE BIVALENT BOOSTER 18+ (MODERNA): CPT | Performed by: FAMILY MEDICINE

## 2022-11-23 RX ORDER — LOSARTAN POTASSIUM 25 MG/1
25 TABLET ORAL DAILY
Qty: 90 TABLET | Refills: 3 | Status: SHIPPED | OUTPATIENT
Start: 2022-11-23 | End: 2023-12-28

## 2022-11-23 RX ORDER — LEVOTHYROXINE SODIUM 137 UG/1
137 TABLET ORAL DAILY
Qty: 90 TABLET | Refills: 3 | Status: SHIPPED | OUTPATIENT
Start: 2022-11-23 | End: 2023-12-28

## 2022-11-23 ASSESSMENT — ENCOUNTER SYMPTOMS
PARESTHESIAS: 0
DYSURIA: 0
SORE THROAT: 0
EYE PAIN: 0
PALPITATIONS: 0
HEARTBURN: 0
CHILLS: 0
HEADACHES: 0
FREQUENCY: 0
COUGH: 0
NAUSEA: 0
DIARRHEA: 0
HEMATOCHEZIA: 0
JOINT SWELLING: 0
ARTHRALGIAS: 0
HEMATURIA: 0
NERVOUS/ANXIOUS: 0
SHORTNESS OF BREATH: 0
ABDOMINAL PAIN: 0
BREAST MASS: 0
CONSTIPATION: 0
DIZZINESS: 0
FEVER: 0
WEAKNESS: 0
MYALGIAS: 0

## 2022-11-23 ASSESSMENT — PAIN SCALES - GENERAL: PAINLEVEL: NO PAIN (0)

## 2022-11-23 NOTE — LETTER
November 29, 2022      Sherry Yates  [address] [address]        Dear ,    We are writing to inform you of your test results.    Your test results fall within the expected range(s) or remain unchanged from previous results.  Please continue with current treatment plan.    Resulted Orders   Comprehensive metabolic panel (BMP + Alb, Alk Phos, ALT, AST, Total. Bili, TP)   Result Value Ref Range    Sodium 137 136 - 145 mmol/L    Potassium 4.1 3.4 - 5.3 mmol/L    Chloride 102 98 - 107 mmol/L    Carbon Dioxide (CO2) 24 22 - 29 mmol/L    Anion Gap 11 7 - 15 mmol/L    Urea Nitrogen 15.7 6.0 - 20.0 mg/dL    Creatinine 0.58 0.51 - 0.95 mg/dL    Calcium 9.6 8.6 - 10.0 mg/dL    Glucose 89 70 - 99 mg/dL    Alkaline Phosphatase 141 (H) 35 - 104 U/L    AST 30 10 - 35 U/L    ALT 29 10 - 35 U/L    Protein Total 7.9 6.4 - 8.3 g/dL    Albumin 4.2 3.5 - 5.2 g/dL    Bilirubin Total 0.9 <=1.2 mg/dL    GFR Estimate >90 >60 mL/min/1.73m2      Comment:      Effective December 21, 2021 eGFRcr in adults is calculated using the 2021 CKD-EPI creatinine equation which includes age and gender (Ricky goldman al., [facility], DOI: 10.1056/YCPVsy0025710)   Lipid panel reflex to direct LDL Fasting   Result Value Ref Range    Cholesterol 181 <200 mg/dL    Triglycerides 155 (H) <150 mg/dL    Direct Measure HDL 60 >=50 mg/dL    LDL Cholesterol Calculated 90 <=100 mg/dL    Non HDL Cholesterol 121 <130 mg/dL    Narrative    Cholesterol  Desirable:  <200 mg/dL    Triglycerides  Normal:  Less than 150 mg/dL  Borderline High:  150-199 mg/dL  High:  200-499 mg/dL  Very High:  Greater than or equal to 500 mg/dL    Direct Measure HDL  Female:  Greater than or equal to 50 mg/dL   Male:  Greater than or equal to 40 mg/dL    LDL Cholesterol  Desirable:  <100mg/dL  Above Desirable:  100-129 mg/dL   Borderline High:  130-159 mg/dL   High:  160-189 mg/dL   Very High:  >= 190 mg/dL    Non HDL Cholesterol  Desirable:  130 mg/dL  Above Desirable:   130-159 mg/dL  Borderline High:  160-189 mg/dL  High:  190-219 mg/dL  Very High:  Greater than or equal to 220 mg/dL   TSH with free T4 reflex   Result Value Ref Range    TSH 0.09 (L) 0.30 - 4.20 uIU/mL   CBC with platelets and differential   Result Value Ref Range    WBC Count 10.1 4.0 - 11.0 10e3/uL    RBC Count 4.53 3.80 - 5.20 10e6/uL    Hemoglobin 12.8 11.7 - 15.7 g/dL    Hematocrit 39.5 35.0 - 47.0 %    MCV 87 78 - 100 fL    MCH 28.3 26.5 - 33.0 pg    MCHC 32.4 31.5 - 36.5 g/dL    RDW 13.3 10.0 - 15.0 %    Platelet Count 244 150 - 450 10e3/uL    % Neutrophils 65 %    % Lymphocytes 26 %    % Monocytes 7 %    % Eosinophils 2 %    % Basophils 0 %    % Immature Granulocytes 0 %    Absolute Neutrophils 6.6 1.6 - 8.3 10e3/uL    Absolute Lymphocytes 2.7 0.8 - 5.3 10e3/uL    Absolute Monocytes 0.7 0.0 - 1.3 10e3/uL    Absolute Eosinophils 0.2 0.0 - 0.7 10e3/uL    Absolute Basophils 0.0 0.0 - 0.2 10e3/uL    Absolute Immature Granulocytes 0.0 <=0.4 10e3/uL   T4 free   Result Value Ref Range    Free T4 1.44 0.90 - 1.70 ng/dL           Here are your recent results which are within the expected range. Your TSH is a bit low meaning we are forcing your body to make more active thyroid but the T4 which is the active form is normal.  If you are ok staying on your dose we can do that.  Please continue with your current plan of care and let us know if you have any questions or concerns.       If you have any questions or concerns, please call the clinic at the number listed above.     Sincerely,  Kimberly Montano MD

## 2022-12-28 ENCOUNTER — ANCILLARY PROCEDURE (OUTPATIENT)
Dept: MAMMOGRAPHY | Facility: HOSPITAL | Age: 47
End: 2022-12-28
Attending: FAMILY MEDICINE
Payer: COMMERCIAL

## 2022-12-28 DIAGNOSIS — Z12.31 VISIT FOR SCREENING MAMMOGRAM: ICD-10-CM

## 2022-12-28 PROCEDURE — 77067 SCR MAMMO BI INCL CAD: CPT

## 2023-04-19 ENCOUNTER — HOSPITAL ENCOUNTER (OUTPATIENT)
Dept: CT IMAGING | Facility: HOSPITAL | Age: 48
Discharge: HOME | End: 2023-04-19
Attending: FAMILY MEDICINE
Payer: COMMERCIAL

## 2023-04-19 ENCOUNTER — OFFICE VISIT (OUTPATIENT)
Dept: FAMILY MEDICINE | Facility: CLINIC | Age: 48
End: 2023-04-19
Payer: COMMERCIAL

## 2023-04-19 ENCOUNTER — HOSPITAL ENCOUNTER (OUTPATIENT)
Dept: ULTRASOUND IMAGING | Facility: HOSPITAL | Age: 48
Discharge: HOME | End: 2023-04-19
Attending: FAMILY MEDICINE
Payer: COMMERCIAL

## 2023-04-19 VITALS
WEIGHT: 283 LBS | DIASTOLIC BLOOD PRESSURE: 106 MMHG | OXYGEN SATURATION: 98 % | HEIGHT: 68 IN | HEART RATE: 69 BPM | SYSTOLIC BLOOD PRESSURE: 161 MMHG | RESPIRATION RATE: 16 BRPM | TEMPERATURE: 97.7 F | BODY MASS INDEX: 42.89 KG/M2

## 2023-04-19 DIAGNOSIS — R10.31 RLQ ABDOMINAL PAIN: Primary | ICD-10-CM

## 2023-04-19 DIAGNOSIS — R10.31 RLQ ABDOMINAL PAIN: ICD-10-CM

## 2023-04-19 DIAGNOSIS — R03.0 ELEVATED BLOOD PRESSURE READING WITHOUT DIAGNOSIS OF HYPERTENSION: ICD-10-CM

## 2023-04-19 PROCEDURE — 76856 US EXAM PELVIC COMPLETE: CPT

## 2023-04-19 PROCEDURE — 74177 CT ABD & PELVIS W/CONTRAST: CPT

## 2023-04-19 PROCEDURE — 99213 OFFICE O/P EST LOW 20 MIN: CPT | Performed by: FAMILY MEDICINE

## 2023-04-19 PROCEDURE — 250N000011 HC RX IP 250 OP 636: Performed by: FAMILY MEDICINE

## 2023-04-19 RX ORDER — IOPAMIDOL 755 MG/ML
100 INJECTION, SOLUTION INTRAVASCULAR ONCE
Status: COMPLETED | OUTPATIENT
Start: 2023-04-19 | End: 2023-04-19

## 2023-04-19 RX ADMIN — IOPAMIDOL 100 ML: 755 INJECTION, SOLUTION INTRAVENOUS at 18:32

## 2023-04-19 ASSESSMENT — PAIN SCALES - GENERAL: PAINLEVEL: MODERATE PAIN (4)

## 2023-04-19 NOTE — PROGRESS NOTES
"  Assessment & Plan     RLQ abdominal pain  No red flag symptoms but will do CT and US to rule out ovarian cyst or chronic appendicitis  - US Pelvic Complete with Transvaginal  - CT Abdomen Pelvis w Contrast    Elevated blood pressure without diagnosis of hypertension:  Elevated due to pain most likely and will closely monitor             BMI:   Estimated body mass index is 42.72 kg/m  as calculated from the following:    Height as of this encounter: 1.734 m (5' 8.25\").    Weight as of this encounter: 128.4 kg (283 lb).   Weight management plan: Discussed healthy diet and exercise guidelines        Kimberly Montano MD  [facility] DIAMANTE Powell is a 48 year old, presenting for the following health issues:  Abdominal Pain        11/23/2022    10:41 AM   Additional Questions   Roomed by Marialuisa MORRIS CMA     History of Present Illness       Reason for visit:  Pain/ area of ovaries  Symptom onset:  3-4 weeks ago  Symptoms include:  Pain, discomfort. pressure on right side of abdomen  Symptom intensity:  Moderate  Symptom progression:  Staying the same  Had these symptoms before:  No    She eats 2-3 servings of fruits and vegetables daily.She consumes 2 sweetened beverage(s) daily.She exercises with enough effort to increase her heart rate 9 or less minutes per day.  She exercises with enough effort to increase her heart rate 3 or less days per week.   She is taking medications regularly.     Radiate into the leg and into the back.  Start on the right side.  Mostly constant.  4-5/10 generally and sometimes 6/10.  Not taking anything for it.  No fevers or chills, no change in bowel habits.  BM fairly normal.  No burning with urination, no blood.            Review of Systems   Constitutional, HEENT, cardiovascular, pulmonary, gi and gu systems are negative, except as otherwise noted.      Objective    BP (!) 161/106 (BP Location: Right arm, Patient Position: Sitting, Cuff Size: Adult Large)   " "Pulse 69   Temp 97.7  F (36.5  C) (Oral)   Resp 16   Ht 1.734 m (5' 8.25\")   Wt 128.4 kg (283 lb)   LMP 04/07/2023 (Exact Date)   SpO2 98%   BMI 42.72 kg/m    Body mass index is 42.72 kg/m .  Physical Exam   GENERAL: healthy, alert and no distress  NECK: no adenopathy, no asymmetry, masses, or scars and thyroid normal to palpation  RESP: lungs clear to auscultation - no rales, rhonchi or wheezes  CV: regular rate and rhythm, normal S1 S2, no S3 or S4, no murmur, click or rub, no peripheral edema and peripheral pulses strong  ABDOMEN: soft, nontender, no hepatosplenomegaly, no masses and bowel sounds normal, tender RLQ but no rebound or guarding.  Exam limited due to habitus somewhat  MS: no gross musculoskeletal defects noted, no edema                    "

## 2023-04-21 ENCOUNTER — MYC MEDICAL ADVICE (OUTPATIENT)
Dept: FAMILY MEDICINE | Facility: CLINIC | Age: 48
End: 2023-04-21
Payer: COMMERCIAL

## 2023-07-20 ENCOUNTER — TRANSFERRED RECORDS (OUTPATIENT)
Dept: HEALTH INFORMATION MANAGEMENT | Facility: CLINIC | Age: 48
End: 2023-07-20
Payer: COMMERCIAL

## 2023-09-13 DIAGNOSIS — K51.90 UC (ULCERATIVE COLITIS) (H): ICD-10-CM

## 2023-09-13 RX ORDER — MESALAMINE 800 MG/1
TABLET, DELAYED RELEASE ORAL
Qty: 270 TABLET | Refills: 2 | OUTPATIENT
Start: 2023-09-13

## 2023-10-24 ENCOUNTER — PATIENT OUTREACH (OUTPATIENT)
Dept: CARE COORDINATION | Facility: CLINIC | Age: 48
End: 2023-10-24
Payer: COMMERCIAL

## 2023-11-04 ENCOUNTER — IMMUNIZATION (OUTPATIENT)
Dept: FAMILY MEDICINE | Facility: CLINIC | Age: 48
End: 2023-11-04
Payer: COMMERCIAL

## 2023-11-04 PROCEDURE — 90471 IMMUNIZATION ADMIN: CPT

## 2023-11-04 PROCEDURE — 90686 IIV4 VACC NO PRSV 0.5 ML IM: CPT

## 2023-11-28 ENCOUNTER — PATIENT OUTREACH (OUTPATIENT)
Dept: CARE COORDINATION | Facility: CLINIC | Age: 48
End: 2023-11-28
Payer: COMMERCIAL

## 2023-12-26 ENCOUNTER — PATIENT OUTREACH (OUTPATIENT)
Dept: CARE COORDINATION | Facility: CLINIC | Age: 48
End: 2023-12-26
Payer: COMMERCIAL

## 2023-12-28 ENCOUNTER — MYC REFILL (OUTPATIENT)
Dept: FAMILY MEDICINE | Facility: CLINIC | Age: 48
End: 2023-12-28
Payer: COMMERCIAL

## 2023-12-28 DIAGNOSIS — E03.9 HYPOTHYROIDISM, UNSPECIFIED TYPE: ICD-10-CM

## 2023-12-28 DIAGNOSIS — I10 ESSENTIAL HYPERTENSION, BENIGN: ICD-10-CM

## 2023-12-28 DIAGNOSIS — K51.919 ULCERATIVE COLITIS WITH COMPLICATION, UNSPECIFIED LOCATION (H): Primary | ICD-10-CM

## 2023-12-28 DIAGNOSIS — K51.90 UC (ULCERATIVE COLITIS) (H): ICD-10-CM

## 2023-12-28 RX ORDER — LOSARTAN POTASSIUM 25 MG/1
25 TABLET ORAL DAILY
Qty: 90 TABLET | Refills: 1 | Status: SHIPPED | OUTPATIENT
Start: 2023-12-28 | End: 2024-07-04

## 2023-12-28 RX ORDER — LEVOTHYROXINE SODIUM 137 UG/1
137 TABLET ORAL DAILY
Qty: 90 TABLET | Refills: 1 | Status: SHIPPED | OUTPATIENT
Start: 2023-12-28 | End: 2024-04-04

## 2023-12-29 RX ORDER — MESALAMINE 800 MG/1
TABLET, DELAYED RELEASE ORAL
Qty: 90 TABLET | Refills: 0 | Status: SHIPPED | OUTPATIENT
Start: 2023-12-29

## 2024-02-25 ENCOUNTER — HEALTH MAINTENANCE LETTER (OUTPATIENT)
Age: 49
End: 2024-02-25

## 2024-04-03 ENCOUNTER — OFFICE VISIT (OUTPATIENT)
Dept: FAMILY MEDICINE | Facility: CLINIC | Age: 49
End: 2024-04-03
Payer: COMMERCIAL

## 2024-04-03 VITALS
SYSTOLIC BLOOD PRESSURE: 120 MMHG | OXYGEN SATURATION: 99 % | HEIGHT: 68 IN | BODY MASS INDEX: 44.41 KG/M2 | WEIGHT: 293 LBS | HEART RATE: 60 BPM | DIASTOLIC BLOOD PRESSURE: 84 MMHG | TEMPERATURE: 97.7 F | RESPIRATION RATE: 16 BRPM

## 2024-04-03 DIAGNOSIS — E66.01 MORBID OBESITY (H): ICD-10-CM

## 2024-04-03 DIAGNOSIS — E22.1 HYPERPROLACTINEMIA (H): ICD-10-CM

## 2024-04-03 DIAGNOSIS — Z00.00 ROUTINE GENERAL MEDICAL EXAMINATION AT A HEALTH CARE FACILITY: Primary | ICD-10-CM

## 2024-04-03 DIAGNOSIS — Z12.11 SCREEN FOR COLON CANCER: ICD-10-CM

## 2024-04-03 DIAGNOSIS — K51.90 ULCERATIVE COLITIS WITHOUT COMPLICATIONS, UNSPECIFIED LOCATION (H): ICD-10-CM

## 2024-04-03 DIAGNOSIS — E03.9 HYPOTHYROIDISM, UNSPECIFIED TYPE: ICD-10-CM

## 2024-04-03 DIAGNOSIS — D35.2 PITUITARY ADENOMA (H): ICD-10-CM

## 2024-04-03 LAB
ALBUMIN SERPL BCG-MCNC: 4.3 G/DL (ref 3.5–5.2)
ALP SERPL-CCNC: 144 U/L (ref 40–150)
ALT SERPL W P-5'-P-CCNC: 40 U/L (ref 0–50)
ANION GAP SERPL CALCULATED.3IONS-SCNC: 10 MMOL/L (ref 7–15)
AST SERPL W P-5'-P-CCNC: 35 U/L (ref 0–45)
BASOPHILS # BLD AUTO: 0 10E3/UL (ref 0–0.2)
BASOPHILS NFR BLD AUTO: 0 %
BILIRUB SERPL-MCNC: 0.8 MG/DL
BUN SERPL-MCNC: 16.1 MG/DL (ref 6–20)
CALCIUM SERPL-MCNC: 9.2 MG/DL (ref 8.6–10)
CHLORIDE SERPL-SCNC: 102 MMOL/L (ref 98–107)
CREAT SERPL-MCNC: 0.72 MG/DL (ref 0.51–0.95)
CRP SERPL-MCNC: 6.47 MG/L
DEPRECATED HCO3 PLAS-SCNC: 27 MMOL/L (ref 22–29)
EGFRCR SERPLBLD CKD-EPI 2021: >90 ML/MIN/1.73M2
EOSINOPHIL # BLD AUTO: 0.2 10E3/UL (ref 0–0.7)
EOSINOPHIL NFR BLD AUTO: 2 %
ERYTHROCYTE [DISTWIDTH] IN BLOOD BY AUTOMATED COUNT: 13.3 % (ref 10–15)
ERYTHROCYTE [SEDIMENTATION RATE] IN BLOOD BY WESTERGREN METHOD: 16 MM/HR (ref 0–30)
GLUCOSE SERPL-MCNC: 89 MG/DL (ref 70–99)
HCT VFR BLD AUTO: 42.2 % (ref 35–47)
HGB BLD-MCNC: 13.8 G/DL (ref 11.7–15.7)
IMM GRANULOCYTES # BLD: 0 10E3/UL
IMM GRANULOCYTES NFR BLD: 0 %
LYMPHOCYTES # BLD AUTO: 1.7 10E3/UL (ref 0.8–5.3)
LYMPHOCYTES NFR BLD AUTO: 18 %
MCH RBC QN AUTO: 30.7 PG (ref 26.5–33)
MCHC RBC AUTO-ENTMCNC: 32.7 G/DL (ref 31.5–36.5)
MCV RBC AUTO: 94 FL (ref 78–100)
MONOCYTES # BLD AUTO: 0.4 10E3/UL (ref 0–1.3)
MONOCYTES NFR BLD AUTO: 5 %
NEUTROPHILS # BLD AUTO: 6.8 10E3/UL (ref 1.6–8.3)
NEUTROPHILS NFR BLD AUTO: 75 %
PLATELET # BLD AUTO: 231 10E3/UL (ref 150–450)
POTASSIUM SERPL-SCNC: 4.2 MMOL/L (ref 3.4–5.3)
PROLACTIN SERPL 3RD IS-MCNC: 16 NG/ML (ref 5–23)
PROT SERPL-MCNC: 7.9 G/DL (ref 6.4–8.3)
RBC # BLD AUTO: 4.5 10E6/UL (ref 3.8–5.2)
SODIUM SERPL-SCNC: 139 MMOL/L (ref 135–145)
T4 FREE SERPL-MCNC: 0.9 NG/DL (ref 0.9–1.7)
TSH SERPL DL<=0.005 MIU/L-ACNC: 10.4 UIU/ML (ref 0.3–4.2)
WBC # BLD AUTO: 9.1 10E3/UL (ref 4–11)

## 2024-04-03 PROCEDURE — 84443 ASSAY THYROID STIM HORMONE: CPT | Performed by: FAMILY MEDICINE

## 2024-04-03 PROCEDURE — 85025 COMPLETE CBC W/AUTO DIFF WBC: CPT | Performed by: FAMILY MEDICINE

## 2024-04-03 PROCEDURE — 90471 IMMUNIZATION ADMIN: CPT | Performed by: FAMILY MEDICINE

## 2024-04-03 PROCEDURE — 99396 PREV VISIT EST AGE 40-64: CPT | Mod: 25 | Performed by: FAMILY MEDICINE

## 2024-04-03 PROCEDURE — 36415 COLL VENOUS BLD VENIPUNCTURE: CPT | Performed by: FAMILY MEDICINE

## 2024-04-03 PROCEDURE — 86140 C-REACTIVE PROTEIN: CPT | Performed by: FAMILY MEDICINE

## 2024-04-03 PROCEDURE — 84439 ASSAY OF FREE THYROXINE: CPT | Performed by: FAMILY MEDICINE

## 2024-04-03 PROCEDURE — 84146 ASSAY OF PROLACTIN: CPT | Performed by: FAMILY MEDICINE

## 2024-04-03 PROCEDURE — 99213 OFFICE O/P EST LOW 20 MIN: CPT | Mod: 25 | Performed by: FAMILY MEDICINE

## 2024-04-03 PROCEDURE — 85652 RBC SED RATE AUTOMATED: CPT | Performed by: FAMILY MEDICINE

## 2024-04-03 PROCEDURE — 80053 COMPREHEN METABOLIC PANEL: CPT | Performed by: FAMILY MEDICINE

## 2024-04-03 PROCEDURE — 90746 HEPB VACCINE 3 DOSE ADULT IM: CPT | Performed by: FAMILY MEDICINE

## 2024-04-03 SDOH — HEALTH STABILITY: PHYSICAL HEALTH: ON AVERAGE, HOW MANY DAYS PER WEEK DO YOU ENGAGE IN MODERATE TO STRENUOUS EXERCISE (LIKE A BRISK WALK)?: 3 DAYS

## 2024-04-03 ASSESSMENT — SOCIAL DETERMINANTS OF HEALTH (SDOH): HOW OFTEN DO YOU GET TOGETHER WITH FRIENDS OR RELATIVES?: ONCE A WEEK

## 2024-04-03 NOTE — LETTER
April 4, 2024      Sherry Yates  [address] [address]        Dear ,    We are writing to inform you of your test results.    Here are your recent results which show your thyroid is low again - would you like to increase your thyroid medication? Please continue with your current plan of care and let us know if you have any questions or concerns.     Resulted Orders   TSH WITH FREE T4 REFLEX   Result Value Ref Range    TSH 10.40 (H) 0.30 - 4.20 uIU/mL   Comprehensive metabolic panel (BMP + Alb, Alk Phos, ALT, AST, Total. Bili, TP)   Result Value Ref Range    Sodium 139 135 - 145 mmol/L      Comment:      Reference intervals for this test were updated on 09/26/2023 to more accurately reflect our healthy population. There may be differences in the flagging of prior results with similar values performed with this method. Interpretation of those prior results can be made in the context of the updated reference intervals.     Potassium 4.2 3.4 - 5.3 mmol/L    Carbon Dioxide (CO2) 27 22 - 29 mmol/L    Anion Gap 10 7 - 15 mmol/L    Urea Nitrogen 16.1 6.0 - 20.0 mg/dL    Creatinine 0.72 0.51 - 0.95 mg/dL    GFR Estimate >90 >60 mL/min/1.73m2    Calcium 9.2 8.6 - 10.0 mg/dL    Chloride 102 98 - 107 mmol/L    Glucose 89 70 - 99 mg/dL    Alkaline Phosphatase 144 40 - 150 U/L      Comment:      Reference intervals for this test were updated on 11/14/2023 to more accurately reflect our healthy population. There may be differences in the flagging of prior results with similar values performed with this method. Interpretation of those prior results can be made in the context of the updated reference intervals.    AST 35 0 - 45 U/L      Comment:      Reference intervals for this test were updated on 6/12/2023 to more accurately reflect our healthy population. There may be differences in the flagging of prior results with similar values performed with this method. Interpretation of those prior results can  be made in the context of the updated reference intervals.    ALT 40 0 - 50 U/L      Comment:      Reference intervals for this test were updated on 6/12/2023 to more accurately reflect our healthy population. There may be differences in the flagging of prior results with similar values performed with this method. Interpretation of those prior results can be made in the context of the updated reference intervals.      Protein Total 7.9 6.4 - 8.3 g/dL    Albumin 4.3 3.5 - 5.2 g/dL    Bilirubin Total 0.8 <=1.2 mg/dL   CRP, inflammation   Result Value Ref Range    CRP Inflammation 6.47 (H) <5.00 mg/L   ESR: Erythrocyte sedimentation rate   Result Value Ref Range    Erythrocyte Sedimentation Rate 16 0 - 30 mm/hr   Prolactin   Result Value Ref Range    Prolactin 16 5 - 23 ng/mL   CBC with platelets and differential   Result Value Ref Range    WBC Count 9.1 4.0 - 11.0 10e3/uL    RBC Count 4.50 3.80 - 5.20 10e6/uL    Hemoglobin 13.8 11.7 - 15.7 g/dL    Hematocrit 42.2 35.0 - 47.0 %    MCV 94 78 - 100 fL    MCH 30.7 26.5 - 33.0 pg    MCHC 32.7 31.5 - 36.5 g/dL    RDW 13.3 10.0 - 15.0 %    Platelet Count 231 150 - 450 10e3/uL    % Neutrophils 75 %    % Lymphocytes 18 %    % Monocytes 5 %    % Eosinophils 2 %    % Basophils 0 %    % Immature Granulocytes 0 %    Absolute Neutrophils 6.8 1.6 - 8.3 10e3/uL    Absolute Lymphocytes 1.7 0.8 - 5.3 10e3/uL    Absolute Monocytes 0.4 0.0 - 1.3 10e3/uL    Absolute Eosinophils 0.2 0.0 - 0.7 10e3/uL    Absolute Basophils 0.0 0.0 - 0.2 10e3/uL    Absolute Immature Granulocytes 0.0 <=0.4 10e3/uL   T4 free   Result Value Ref Range    Free T4 0.90 0.90 - 1.70 ng/dL       If you have any questions or concerns, please call the clinic at the number listed above.       Sincerely,      Kimberly Montano MD

## 2024-04-03 NOTE — PROGRESS NOTES
"Preventive Care Visit  [facility]  Kimberly Montano MD, Family Medicine  Apr 3, 2024      Assessment & Plan     Routine general medical examination at a health care facility  Routine labs  - CBC with platelets and differential  - Comprehensive metabolic panel (BMP + Alb, Alk Phos, ALT, AST, Total. Bili, TP)  - CRP, inflammation  - ESR: Erythrocyte sedimentation rate  - MA Screen Bilateral w/Rich  - CBC with platelets and differential  - Comprehensive metabolic panel (BMP + Alb, Alk Phos, ALT, AST, Total. Bili, TP)  - CRP, inflammation  - ESR: Erythrocyte sedimentation rate    Screen for colon cancer  Seen by GI and getting scheduled    Hypothyroidism, unspecified type  Repeat labs.  - TSH WITH FREE T4 REFLEX  - TSH WITH FREE T4 REFLEX  - T4 free    Ulcerative colitis without complications, unspecified location (H)  Seen by GI and will do labs    Hyperprolactinemia (H24)  Stable.  Will continue to monitor with labs    Morbid obesity (H)  Discussed diet and exercise - going to try WW    Pituitary adenoma (H)  - Prolactin  - Prolactin                BMI  Estimated body mass index is 46.36 kg/m  as calculated from the following:    Height as of this encounter: 1.734 m (5' 8.27\").    Weight as of this encounter: 139.4 kg (307 lb 4.8 oz).   Weight management plan: Discussed healthy diet and exercise guidelines    Counseling  Appropriate preventive services were discussed with this patient, including applicable screening as appropriate for fall prevention, nutrition, physical activity, Tobacco-use cessation, weight loss and cognition.  Checklist reviewing preventive services available has been given to the patient.  Reviewed patient's diet, addressing concerns and/or questions.   She is at risk for lack of exercise and has been provided with information to increase physical activity for the benefit of her well-being.   She is at risk for psychosocial distress and has been provided with information to " reduce risk.           Adalberto Powell is a 49 year old, presenting for the following:  Physical (Pain right ovary per pt)        4/3/2024     9:34 AM   Additional Questions   Roomed by Mary Kate SMITH MA        Health Care Directive  Patient does not have a Health Care Directive or Living Will: Discussed advance care planning with patient; information given to patient to review.    HPI  Increased stress with family - MIGUEL recently passed, father ill.  Not prioritizing herself.  Went to anxiety therapy and difficulty getting appointments due to work.      Went to GI and colonoscopy coming due. Bowels doing really well.      Still pain in the right ovary from time to time              4/3/2024   General Health   How would you rate your overall physical health? Good   Feel stress (tense, anxious, or unable to sleep) Only a little   (!) STRESS CONCERN      4/3/2024   Nutrition   Three or more servings of calcium each day? Yes   Diet: Regular (no restrictions)   How many servings of fruit and vegetables per day? (!) 2-3   How many sweetened beverages each day? 0-1         4/3/2024   Exercise   Days per week of moderate/strenous exercise 3 days         4/3/2024   Social Factors   Frequency of gathering with friends or relatives Once a week   Worry food won't last until get money to buy more No   Food not last or not have enough money for food? No   Do you have housing?  Yes   Are you worried about losing your housing? No   Lack of transportation? No   Unable to get utilities (heat,electricity)? No          No data to display                   4/3/2024   Dental   Dentist two times every year? Yes         4/3/2024   TB Screening   Were you born outside of the [place]? No         Today's PHQ-2 Score:       4/3/2024     9:31 AM   PHQ-2 ( 1999 Pfizer)   Q1: Little interest or pleasure in doing things 0   Q2: Feeling down, depressed or hopeless 0   PHQ-2 Score 0   Q1: Little interest or pleasure in doing things Not at all   Q2: Feeling  "down, depressed or hopeless Not at all   PHQ-2 Score 0           4/3/2024   Substance Use   Alcohol more than 3/day or more than 7/wk No   Do you use any other substances recreationally? No     Social History     Tobacco Use    Smoking status: Never     Passive exposure: Never    Smokeless tobacco: Never    Tobacco comments:     no exposure   Vaping Use    Vaping Use: Never used   Substance Use Topics    Alcohol use: Yes     Comment: rare    Drug use: No             4/3/2024   Breast Cancer Screening   Family history of breast, colon, or ovarian cancer? No / Unknown         12/28/2022   LAST FHS-7 RESULTS   1st degree relative breast or ovarian cancer No   Any relative bilateral breast cancer No   Any male have breast cancer No   Any ONE woman have BOTH breast AND ovarian cancer No   Any woman with breast cancer before 50yrs No   2 or more relatives with breast AND/OR ovarian cancer No   2 or more relatives with breast AND/OR bowel cancer No                4/3/2024   STI Screening   New sexual partner(s) since last STI/HIV test? No     History of abnormal Pap smear: Last 3 Pap Results: No results found for: \"PAP\"        Latest Ref Rng & Units 12/15/2021     8:56 AM 8/1/2016    12:00 AM   PAP / HPV   PAP  Negative for Intraepithelial Lesion or Malignancy (NILM)     HPV 16 DNA Negative Negative     HPV 18 DNA Negative Negative     HPV_EXT - HISTORICAL   See Scanned Report    Other HR HPV Negative Negative       ASCVD Risk   The 10-year ASCVD risk score (Odalis GUARDADO, et al., 2019) is: 1.3%    Values used to calculate the score:      Age: 49 years      Sex: Female      Is Non- : No      Diabetic: No      Tobacco smoker: No      Systolic Blood Pressure: 120 mmHg      Is BP treated: Yes      HDL Cholesterol: 60 mg/dL      Total Cholesterol: 181 mg/dL           Reviewed and updated as needed this visit by Provider                          Review of Systems  Constitutional, HEENT, " "cardiovascular, pulmonary, gi and gu systems are negative, except as otherwise noted.     Objective    Exam  /84 (BP Location: Right arm, Patient Position: Sitting, Cuff Size: Adult Large)   Pulse 60   Temp 97.7  F (36.5  C) (Oral)   Resp 16   Ht 1.734 m (5' 8.27\")   Wt 139.4 kg (307 lb 4.8 oz)   LMP 03/11/2024 (Approximate)   SpO2 99%   BMI 46.36 kg/m     Estimated body mass index is 46.36 kg/m  as calculated from the following:    Height as of this encounter: 1.734 m (5' 8.27\").    Weight as of this encounter: 139.4 kg (307 lb 4.8 oz).    Physical Exam  GENERAL: alert and no distress  EYES: Eyes grossly normal to inspection, PERRL and conjunctivae and sclerae normal  HENT: ear canals and TM's normal, nose and mouth without ulcers or lesions  NECK: no adenopathy, no asymmetry, masses, or scars  RESP: lungs clear to auscultation - no rales, rhonchi or wheezes  CV: regular rate and rhythm, normal S1 S2, no S3 or S4, no murmur, click or rub, no peripheral edema  ABDOMEN: soft, nontender, no hepatosplenomegaly, no masses and bowel sounds normal  MS: no gross musculoskeletal defects noted, no edema  SKIN: no suspicious lesions or rashes  NEURO: Normal strength and tone, mentation intact and speech normal  PSYCH: mentation appears normal, affect normal/bright        Signed Electronically by: Kimberly Montano MD    "

## 2024-04-04 ENCOUNTER — MYC MEDICAL ADVICE (OUTPATIENT)
Dept: FAMILY MEDICINE | Facility: CLINIC | Age: 49
End: 2024-04-04
Payer: COMMERCIAL

## 2024-04-04 DIAGNOSIS — E03.9 HYPOTHYROIDISM, UNSPECIFIED TYPE: ICD-10-CM

## 2024-04-04 RX ORDER — LEVOTHYROXINE SODIUM 150 UG/1
150 TABLET ORAL DAILY
Qty: 90 TABLET | Refills: 0 | Status: SHIPPED | OUTPATIENT
Start: 2024-04-04 | End: 2024-07-04

## 2024-04-15 NOTE — PATIENT INSTRUCTIONS
Dr Daina Watters at [facility]    Preventive Care Advice   This is general advice given by our system to help you stay healthy. However, your care team may have specific advice just for you. Please talk to your care team about your preventive care needs.  Nutrition  Eat 5 or more servings of fruits and vegetables each day.  Try wheat bread, brown rice and whole grain pasta (instead of white bread, rice, and pasta).  Get enough calcium and vitamin D. Check the label on foods and aim for 100% of the RDA (recommended daily allowance).  Lifestyle  Exercise at least 150 minutes each week   (30 minutes a day, 5 days a week).  Do muscle strengthening activities 2 days a week. These help control your weight and prevent disease.  No smoking.  Wear sunscreen to prevent skin cancer.  Have a dental exam and cleaning every 6 months.  Yearly exams  See your health care team every year to talk about:  Any changes in your health.  Any medicines your care team has prescribed.  Preventive care, family planning, and ways to prevent chronic diseases.  Shots (vaccines)   HPV shots (up to age 26), if you've never had them before.  Hepatitis B shots (up to age 59), if you've never had them before.  COVID-19 shot: Get this shot when it's due.  Flu shot: Get a flu shot every year.  Tetanus shot: Get a tetanus shot every 10 years.  Pneumococcal, hepatitis A, and RSV shots: Ask your care team if you need these based on your risk.  Shingles shot (for age 50 and up).  General health tests  Diabetes screening:  Starting at age 35, Get screened for diabetes at least every 3 years.  If you are younger than age 35, ask your care team if you should be screened for diabetes.  Cholesterol test: At age 39, start having a cholesterol test every 5 years, or more often if advised.  Bone density scan (DEXA): At age 50, ask your care team if you should have this scan for osteoporosis (brittle bones).  Hepatitis C: Get tested at least once in your  life.  STIs (sexually transmitted infections)  Before age 24: Ask your care team if you should be screened for STIs.  After age 24: Get screened for STIs if you're at risk. You are at risk for STIs (including HIV) if:  You are sexually active with more than one person.  You don't use condoms every time.  You or a partner was diagnosed with a sexually transmitted infection.  If you are at risk for HIV, ask about PrEP medicine to prevent HIV.  Get tested for HIV at least once in your life, whether you are at risk for HIV or not.  Cancer screening tests  Cervical cancer screening: If you have a cervix, begin getting regular cervical cancer screening tests at age 21. Most people who have regular screenings with normal results can stop after age 65. Talk about this with your provider.  Breast cancer scan (mammogram): If you've ever had breasts, begin having regular mammograms starting at age 40. This is a scan to check for breast cancer.  Colon cancer screening: It is important to start screening for colon cancer at age 45.  Have a colonoscopy test every 10 years (or more often if you're at risk) Or, ask your provider about stool tests like a FIT test every year or Cologuard test every 3 years.  To learn more about your testing options, visit: https://www.[employer]/639776.pdf.  For help making a decision, visit: https://bit.ly/ds50436.  Prostate cancer screening test: If you have a prostate and are age 55 to 69, ask your provider if you would benefit from a yearly prostate cancer screening test.  Lung cancer screening: If you are a current or former smoker age 50 to 80, ask your care team if ongoing lung cancer screenings are right for you.  For informational purposes only. Not to replace the advice of your health care provider. Copyright   2023 [employer]. All rights reserved. Clinically reviewed by the [facility] Transitions Program. [employer] [id number] - REV 01/24.    Learning About Stress  What  is stress?     Stress is your body's response to a hard situation. Your body can have a physical, emotional, or mental response. Stress is a fact of life for most people, and it affects everyone differently. What causes stress for you may not be stressful for someone else.  A lot of things can cause stress. You may feel stress when you go on a job interview, take a test, or run a race. This kind of short-term stress is normal and even useful. It can help you if you need to work hard or react quickly. For example, stress can help you finish an important job on time.  Long-term stress is caused by ongoing stressful situations or events. Examples of long-term stress include long-term health problems, ongoing problems at work, or conflicts in your family. Long-term stress can harm your health.  How does stress affect your health?  When you are stressed, your body responds as though you are in danger. It makes hormones that speed up your heart, make you breathe faster, and give you a burst of energy. This is called the fight-or-flight stress response. If the stress is over quickly, your body goes back to normal and no harm is done.  But if stress happens too often or lasts too long, it can have bad effects. Long-term stress can make you more likely to get sick, and it can make symptoms of some diseases worse. If you tense up when you are stressed, you may develop neck, shoulder, or low back pain. Stress is linked to high blood pressure and heart disease.  Stress also harms your emotional health. It can make you sommer, tense, or depressed. Your relationships may suffer, and you may not do well at work or school.  What can you do to manage stress?  You can try these things to help manage stress:   Do something active. Exercise or activity can help reduce stress. Walking is a great way to get started. Even everyday activities such as housecleaning or yard work can help.  Try yoga or noreen chi. These techniques combine  exercise and meditation. You may need some training at first to learn them.  Do something you enjoy. For example, listen to music or go to a movie. Practice your hobby or do volunteer work.  Meditate. This can help you relax, because you are not worrying about what happened before or what may happen in the future.  Do guided imagery. Imagine yourself in any setting that helps you feel calm. You can use online videos, books, or a teacher to guide you.  Do breathing exercises. For example:  From a standing position, bend forward from the waist with your knees slightly bent. Let your arms dangle close to the floor.  Breathe in slowly and deeply as you return to a standing position. Roll up slowly and lift your head last.  Hold your breath for just a few seconds in the standing position.  Breathe out slowly and bend forward from the waist.  Let your feelings out. Talk, laugh, cry, and express anger when you need to. Talking with supportive friends or family, a counselor, or a ming leader about your feelings is a healthy way to relieve stress. Avoid discussing your feelings with people who make you feel worse.  Write. It may help to write about things that are bothering you. This helps you find out how much stress you feel and what is causing it. When you know this, you can find better ways to cope.  What can you do to prevent stress?  You might try some of these things to help prevent stress:  Manage your time. This helps you find time to do the things you want and need to do.  Get enough sleep. Your body recovers from the stresses of the day while you are sleeping.  Get support. Your family, friends, and community can make a difference in how you experience stress.  Limit your news feed. Avoid or limit time on social media or news that may make you feel stressed.  Do something active. Exercise or activity can help reduce stress. Walking is a great way to get started.  Where can you learn more?  Go to  "https://www.[employer].net/patiented  Enter N032 in the search box to learn more about \"Learning About Stress.\"  Current as of: October 24, 2023               Content Version: 14.0    2748-4607 [employer].   Care instructions adapted under license by your healthcare professional. If you have questions about a medical condition or this instruction, always ask your healthcare professional. [employer], [employer] disclaims any warranty or liability for your use of this information.      " Psych/Behavioral

## 2024-06-06 ENCOUNTER — ALLIED HEALTH/NURSE VISIT (OUTPATIENT)
Dept: FAMILY MEDICINE | Facility: CLINIC | Age: 49
End: 2024-06-06
Payer: COMMERCIAL

## 2024-06-06 DIAGNOSIS — Z23 ENCOUNTER FOR IMMUNIZATION: Primary | ICD-10-CM

## 2024-06-06 PROCEDURE — 90746 HEPB VACCINE 3 DOSE ADULT IM: CPT

## 2024-06-06 PROCEDURE — 90471 IMMUNIZATION ADMIN: CPT

## 2024-07-04 DIAGNOSIS — I10 ESSENTIAL HYPERTENSION, BENIGN: ICD-10-CM

## 2024-07-04 DIAGNOSIS — E03.9 HYPOTHYROIDISM, UNSPECIFIED TYPE: ICD-10-CM

## 2024-07-05 RX ORDER — LOSARTAN POTASSIUM 25 MG/1
25 TABLET ORAL DAILY
Qty: 90 TABLET | Refills: 2 | Status: SHIPPED | OUTPATIENT
Start: 2024-07-05

## 2024-07-08 RX ORDER — LEVOTHYROXINE SODIUM 150 UG/1
150 TABLET ORAL DAILY
Qty: 90 TABLET | Refills: 3 | Status: SHIPPED | OUTPATIENT
Start: 2024-07-08

## 2024-07-15 ENCOUNTER — MYC REFILL (OUTPATIENT)
Dept: FAMILY MEDICINE | Facility: CLINIC | Age: 49
End: 2024-07-15
Payer: COMMERCIAL

## 2024-07-15 DIAGNOSIS — I10 ESSENTIAL HYPERTENSION, BENIGN: ICD-10-CM

## 2024-07-15 RX ORDER — LOSARTAN POTASSIUM 25 MG/1
25 TABLET ORAL DAILY
Qty: 90 TABLET | Refills: 2 | OUTPATIENT
Start: 2024-07-15

## 2024-08-15 ENCOUNTER — MYC MEDICAL ADVICE (OUTPATIENT)
Dept: FAMILY MEDICINE | Facility: CLINIC | Age: 49
End: 2024-08-15
Payer: COMMERCIAL

## 2024-08-15 DIAGNOSIS — R10.31 RLQ ABDOMINAL PAIN: Primary | ICD-10-CM

## 2024-08-19 NOTE — TELEPHONE ENCOUNTER
Referral is wendy'd up for verification and approval, if appropriate. Additional questions have been answered already.      Thank you,  Ky Wilson Jr., CMA on 8/19/2024 at 10:22 AM

## 2024-09-18 ENCOUNTER — TRANSFERRED RECORDS (OUTPATIENT)
Dept: HEALTH INFORMATION MANAGEMENT | Facility: CLINIC | Age: 49
End: 2024-09-18
Payer: COMMERCIAL

## 2024-12-17 ENCOUNTER — TRANSFERRED RECORDS (OUTPATIENT)
Dept: MULTI SPECIALTY CLINIC | Facility: CLINIC | Age: 49
End: 2024-12-17

## 2025-01-08 ENCOUNTER — LAB REQUISITION (OUTPATIENT)
Dept: LAB | Facility: CLINIC | Age: 50
End: 2025-01-08

## 2025-01-08 DIAGNOSIS — N92.0 EXCESSIVE AND FREQUENT MENSTRUATION WITH REGULAR CYCLE: ICD-10-CM

## 2025-01-08 PROCEDURE — 88305 TISSUE EXAM BY PATHOLOGIST: CPT | Performed by: PATHOLOGY

## 2025-01-13 LAB
PATH REPORT.COMMENTS IMP SPEC: NORMAL
PATH REPORT.COMMENTS IMP SPEC: NORMAL
PATH REPORT.FINAL DX SPEC: NORMAL
PATH REPORT.GROSS SPEC: NORMAL
PATH REPORT.MICROSCOPIC SPEC OTHER STN: NORMAL
PATH REPORT.RELEVANT HX SPEC: NORMAL
PHOTO IMAGE: NORMAL

## 2025-03-15 ENCOUNTER — HEALTH MAINTENANCE LETTER (OUTPATIENT)
Age: 50
End: 2025-03-15

## 2025-04-23 ENCOUNTER — OFFICE VISIT (OUTPATIENT)
Dept: FAMILY MEDICINE | Facility: CLINIC | Age: 50
End: 2025-04-23
Payer: COMMERCIAL

## 2025-04-23 VITALS
TEMPERATURE: 98.3 F | HEIGHT: 68 IN | RESPIRATION RATE: 18 BRPM | DIASTOLIC BLOOD PRESSURE: 78 MMHG | OXYGEN SATURATION: 97 % | SYSTOLIC BLOOD PRESSURE: 120 MMHG | BODY MASS INDEX: 44.41 KG/M2 | WEIGHT: 293 LBS | HEART RATE: 61 BPM

## 2025-04-23 DIAGNOSIS — K51.919 ULCERATIVE COLITIS WITH COMPLICATION, UNSPECIFIED LOCATION (H): ICD-10-CM

## 2025-04-23 DIAGNOSIS — E66.01 MORBID OBESITY (H): ICD-10-CM

## 2025-04-23 DIAGNOSIS — Z13.6 SCREENING FOR CARDIOVASCULAR CONDITION: ICD-10-CM

## 2025-04-23 DIAGNOSIS — Z12.11 SCREEN FOR COLON CANCER: ICD-10-CM

## 2025-04-23 DIAGNOSIS — Z12.31 VISIT FOR SCREENING MAMMOGRAM: ICD-10-CM

## 2025-04-23 DIAGNOSIS — I10 ESSENTIAL HYPERTENSION, BENIGN: ICD-10-CM

## 2025-04-23 DIAGNOSIS — Z00.00 ROUTINE GENERAL MEDICAL EXAMINATION AT A HEALTH CARE FACILITY: Primary | ICD-10-CM

## 2025-04-23 DIAGNOSIS — E03.9 HYPOTHYROIDISM, UNSPECIFIED TYPE: ICD-10-CM

## 2025-04-23 PROCEDURE — 99214 OFFICE O/P EST MOD 30 MIN: CPT | Mod: 25 | Performed by: FAMILY MEDICINE

## 2025-04-23 PROCEDURE — 80061 LIPID PANEL: CPT | Performed by: FAMILY MEDICINE

## 2025-04-23 PROCEDURE — 3074F SYST BP LT 130 MM HG: CPT | Performed by: FAMILY MEDICINE

## 2025-04-23 PROCEDURE — 80053 COMPREHEN METABOLIC PANEL: CPT | Performed by: FAMILY MEDICINE

## 2025-04-23 PROCEDURE — 91320 SARSCV2 VAC 30MCG TRS-SUC IM: CPT | Performed by: FAMILY MEDICINE

## 2025-04-23 PROCEDURE — 36415 COLL VENOUS BLD VENIPUNCTURE: CPT | Performed by: FAMILY MEDICINE

## 2025-04-23 PROCEDURE — 84439 ASSAY OF FREE THYROXINE: CPT | Performed by: FAMILY MEDICINE

## 2025-04-23 PROCEDURE — 90471 IMMUNIZATION ADMIN: CPT | Performed by: FAMILY MEDICINE

## 2025-04-23 PROCEDURE — 90480 ADMN SARSCOV2 VAC 1/ONLY CMP: CPT | Performed by: FAMILY MEDICINE

## 2025-04-23 PROCEDURE — 3078F DIAST BP <80 MM HG: CPT | Performed by: FAMILY MEDICINE

## 2025-04-23 PROCEDURE — 99396 PREV VISIT EST AGE 40-64: CPT | Mod: 25 | Performed by: FAMILY MEDICINE

## 2025-04-23 PROCEDURE — G2211 COMPLEX E/M VISIT ADD ON: HCPCS | Performed by: FAMILY MEDICINE

## 2025-04-23 PROCEDURE — 90746 HEPB VACCINE 3 DOSE ADULT IM: CPT | Performed by: FAMILY MEDICINE

## 2025-04-23 PROCEDURE — 84443 ASSAY THYROID STIM HORMONE: CPT | Performed by: FAMILY MEDICINE

## 2025-04-23 RX ORDER — LEVOTHYROXINE SODIUM 150 UG/1
150 TABLET ORAL DAILY
Qty: 90 TABLET | Refills: 3 | Status: SHIPPED | OUTPATIENT
Start: 2025-04-23

## 2025-04-23 RX ORDER — MESALAMINE 800 MG/1
TABLET, DELAYED RELEASE ORAL
Qty: 90 TABLET | Refills: 3 | Status: SHIPPED | OUTPATIENT
Start: 2025-04-23

## 2025-04-23 RX ORDER — LOSARTAN POTASSIUM 25 MG/1
25 TABLET ORAL DAILY
Qty: 90 TABLET | Refills: 3 | Status: SHIPPED | OUTPATIENT
Start: 2025-04-23

## 2025-04-23 SDOH — HEALTH STABILITY: PHYSICAL HEALTH: ON AVERAGE, HOW MANY DAYS PER WEEK DO YOU ENGAGE IN MODERATE TO STRENUOUS EXERCISE (LIKE A BRISK WALK)?: 2 DAYS

## 2025-04-23 ASSESSMENT — SOCIAL DETERMINANTS OF HEALTH (SDOH): HOW OFTEN DO YOU GET TOGETHER WITH FRIENDS OR RELATIVES?: ONCE A WEEK

## 2025-04-23 NOTE — PROGRESS NOTES
"Preventive Care Visit  [facility]  Kimberly Montano MD, Family Medicine  Apr 23, 2025      Assessment & Plan     Routine general medical examination at a health care facility  Routine screening and prevention    Essential hypertension, benign  Well controlled.  Labs to be done.  - Comprehensive metabolic panel (BMP + Alb, Alk Phos, ALT, AST, Total. Bili, TP)  - losartan (COZAAR) 25 MG tablet  Dispense: 90 tablet; Refill: 3    Hypothyroidism, unspecified type  Repeat labs and refill medications  - TSH WITH FREE T4 REFLEX  - levothyroxine (SYNTHROID/LEVOTHROID) 150 MCG tablet  Dispense: 90 tablet; Refill: 3    Screen for colon cancer  Will abstract recent colonoscopy    Ulcerative colitis with complication, unspecified location (H)  Stable.  Refill medication  - mesalamine (ASACOL HD) 800 MG EC tablet  Dispense: 90 tablet; Refill: 3    Visit for screening mammogram  Stable.  Ordered for this fall  - MA Screen Bilateral w/Rich    Morbid obesity (H)  Discussed diet and exercise.  Goal is to lose with diet changes and would like to try and lose 5lbs a month.    Screening for cardiovascular condition  - Lipid panel reflex to direct LDL Fasting      Patient has been advised of split billing requirements and indicates understanding: Yes        BMI  Estimated body mass index is 48.1 kg/m  as calculated from the following:    Height as of this encounter: 1.72 m (5' 7.72\").    Weight as of this encounter: 142.3 kg (313 lb 11.2 oz).   Weight management plan: Discussed healthy diet and exercise guidelines    Counseling  Appropriate preventive services were addressed with this patient via screening, questionnaire, or discussion as appropriate for fall prevention, nutrition, physical activity, Tobacco-use cessation, social engagement, weight loss and cognition.  Checklist reviewing preventive services available has been given to the patient.  Reviewed patient's diet, addressing concerns and/or questions. "   She is at risk for lack of exercise and has been provided with information to increase physical activity for the benefit of her well-being.           Adalberto Powell is a 50 year old, presenting for the following:  Physical        4/23/2025    11:15 AM   Additional Questions   Roomed by Cassandra Peña   Accompanied by N/A        Via the Health Maintenance questionnaire, the patient has reported the following services have been completed [facility]: [facility] 2024-12-17, this information has been sent to the abstraction team.    HPI   Going to start a work out plan.  Would like to wait a year and see where she can get on her own without drugs or supplements.  Still having pain in lower abdomen  - seen by OBGYN.  Previously had the pain daily and then varied in intensity but stopped doing that.  No radiation down the leg any longer.  No etiology.  Feels like ovary pain she says.      The longitudinal plan of care for the diagnosis(es)/condition(s) as documented were addressed during this visit. Due to the added complexity in care, I will continue to support Sherry in the subsequent management and with ongoing continuity of care.        Advance Care Planning    Discussed advance care planning with patient; informed AVS has link to [facility] [employer].        4/23/2025   General Health   How would you rate your overall physical health? Good   Feel stress (tense, anxious, or unable to sleep) Only a little   (!) STRESS CONCERN      4/23/2025   Nutrition   Three or more servings of calcium each day? Yes   Diet: Regular (no restrictions)   How many servings of fruit and vegetables per day? (!) 2-3   How many sweetened beverages each day? 0-1         4/23/2025   Exercise   Days per week of moderate/strenous exercise 2 days   (!) EXERCISE CONCERN      4/23/2025   Social Factors   Frequency of gathering with friends or relatives Once a week   Worry food won't last until get  money to buy more No   Food not last or not have enough money for food? No   Do you have housing? (Housing is defined as stable permanent housing and does not include staying outside in a car, in a tent, in an abandoned building, in an overnight shelter, or couch-surfing.) No   Are you worried about losing your housing? No   Lack of transportation? No   Unable to get utilities (heat,electricity)? No   Want help with housing or utility concern? No   (!) HOUSING CONCERN PRESENT      4/23/2025   Fall Risk   Fallen 2 or more times in the past year? No   Trouble with walking or balance? No          4/23/2025   Dental   Dentist two times every year? Yes         Today's PHQ-2 Score:       4/23/2025    11:11 AM   PHQ-2 ( 1999 [employer])   Q1: Little interest or pleasure in doing things 0   Q2: Feeling down, depressed or hopeless 0   PHQ-2 Score 0    Q1: Little interest or pleasure in doing things Not at all   Q2: Feeling down, depressed or hopeless Not at all   PHQ-2 Score 0       Patient-reported           4/23/2025   Substance Use   Alcohol more than 3/day or more than 7/wk No   Do you use any other substances recreationally? No     Social History     Tobacco Use    Smoking status: Never     Passive exposure: Never    Smokeless tobacco: Never    Tobacco comments:     no exposure   Vaping Use    Vaping status: Never Used   Substance Use Topics    Alcohol use: Yes     Comment: rare    Drug use: No           12/28/2022   LAST FHS-7 RESULTS   1st degree relative breast or ovarian cancer No   Any relative bilateral breast cancer No   Any male have breast cancer No   Any ONE woman have BOTH breast AND ovarian cancer No   Any woman with breast cancer before 50yrs No   2 or more relatives with breast AND/OR ovarian cancer No   2 or more relatives with breast AND/OR bowel cancer No        Mammogram Screening - Mammogram every 1-2 years updated in Health Maintenance based on mutual decision making        4/23/2025   STI Screening  "  New sexual partner(s) since last STI/HIV test? No     History of abnormal Pap smear: No - age 30- 64 PAP with HPV every 5 years recommended        Latest Ref Rng & Units 12/15/2021     8:56 AM 8/1/2016    12:00 AM   PAP / HPV   PAP  Negative for Intraepithelial Lesion or Malignancy (NILM)     HPV 16 DNA Negative Negative     HPV 18 DNA Negative Negative     HPV_EXT - HISTORICAL   See Scanned Report    Other HR HPV Negative Negative       ASCVD Risk   The 10-year ASCVD risk score (Odalis GUARDADO, et al., 2019) is: 1.4%    Values used to calculate the score:      Age: 50 years      Sex: Female      Is Non- : No      Diabetic: No      Tobacco smoker: No      Systolic Blood Pressure: 120 mmHg      Is BP treated: Yes      HDL Cholesterol: 60 mg/dL      Total Cholesterol: 181 mg/dL           Reviewed and updated as needed this visit by Provider                          Review of Systems  Constitutional, HEENT, cardiovascular, pulmonary, gi and gu systems are negative, except as otherwise noted.     Objective    Exam  /78   Pulse 61   Temp 98.3  F (36.8  C) (Oral)   Resp 18   Ht 1.72 m (5' 7.72\")   Wt (!) 142.3 kg (313 lb 11.2 oz)   LMP 04/18/2025   SpO2 97%   BMI 48.10 kg/m     Estimated body mass index is 48.1 kg/m  as calculated from the following:    Height as of this encounter: 1.72 m (5' 7.72\").    Weight as of this encounter: 142.3 kg (313 lb 11.2 oz).    Physical Exam  GENERAL: alert and no distress  EYES: Eyes grossly normal to inspection, PERRL and conjunctivae and sclerae normal  HENT: ear canals and TM's normal, nose and mouth without ulcers or lesions  NECK: no adenopathy, no asymmetry, masses, or scars  RESP: lungs clear to auscultation - no rales, rhonchi or wheezes  CV: regular rate and rhythm, normal S1 S2, no S3 or S4, no murmur, click or rub, no peripheral edema  ABDOMEN: soft, nontender, no hepatosplenomegaly, no masses and bowel sounds normal  MS: no gross " musculoskeletal defects noted, no edema  SKIN: no suspicious lesions or rashes  NEURO: Normal strength and tone, mentation intact and speech normal  PSYCH: mentation appears normal, affect normal/bright        Signed Electronically by: Kimberly Montano MD

## 2025-04-24 ENCOUNTER — PATIENT OUTREACH (OUTPATIENT)
Dept: CARE COORDINATION | Facility: CLINIC | Age: 50
End: 2025-04-24
Payer: COMMERCIAL

## 2025-04-24 LAB
ALBUMIN SERPL BCG-MCNC: 4.2 G/DL (ref 3.5–5.2)
ALP SERPL-CCNC: 178 U/L (ref 40–150)
ALT SERPL W P-5'-P-CCNC: 61 U/L (ref 0–50)
ANION GAP SERPL CALCULATED.3IONS-SCNC: 11 MMOL/L (ref 7–15)
AST SERPL W P-5'-P-CCNC: 61 U/L (ref 0–45)
BILIRUB SERPL-MCNC: 1.1 MG/DL
BUN SERPL-MCNC: 17.2 MG/DL (ref 6–20)
CALCIUM SERPL-MCNC: 9.3 MG/DL (ref 8.8–10.4)
CHLORIDE SERPL-SCNC: 101 MMOL/L (ref 98–107)
CHOLEST SERPL-MCNC: 240 MG/DL
CREAT SERPL-MCNC: 0.74 MG/DL (ref 0.51–0.95)
EGFRCR SERPLBLD CKD-EPI 2021: >90 ML/MIN/1.73M2
FASTING STATUS PATIENT QL REPORTED: YES
FASTING STATUS PATIENT QL REPORTED: YES
GLUCOSE SERPL-MCNC: 101 MG/DL (ref 70–99)
HCO3 SERPL-SCNC: 26 MMOL/L (ref 22–29)
HDLC SERPL-MCNC: 73 MG/DL
LDLC SERPL CALC-MCNC: 135 MG/DL
NONHDLC SERPL-MCNC: 167 MG/DL
POTASSIUM SERPL-SCNC: 4 MMOL/L (ref 3.4–5.3)
PROT SERPL-MCNC: 8 G/DL (ref 6.4–8.3)
SODIUM SERPL-SCNC: 138 MMOL/L (ref 135–145)
T4 FREE SERPL-MCNC: 1.06 NG/DL (ref 0.9–1.7)
TRIGL SERPL-MCNC: 160 MG/DL
TSH SERPL DL<=0.005 MIU/L-ACNC: 6.32 UIU/ML (ref 0.3–4.2)

## 2025-07-16 ENCOUNTER — ANCILLARY PROCEDURE (OUTPATIENT)
Dept: MAMMOGRAPHY | Facility: HOSPITAL | Age: 50
End: 2025-07-16
Attending: FAMILY MEDICINE
Payer: COMMERCIAL

## 2025-07-16 DIAGNOSIS — Z12.31 VISIT FOR SCREENING MAMMOGRAM: ICD-10-CM

## 2025-07-16 PROCEDURE — 77067 SCR MAMMO BI INCL CAD: CPT
